# Patient Record
Sex: MALE | Race: WHITE | Employment: OTHER | URBAN - METROPOLITAN AREA
[De-identification: names, ages, dates, MRNs, and addresses within clinical notes are randomized per-mention and may not be internally consistent; named-entity substitution may affect disease eponyms.]

---

## 2017-03-28 ENCOUNTER — TELEPHONE (OUTPATIENT)
Dept: FAMILY MEDICINE | Facility: CLINIC | Age: 77
End: 2017-03-28

## 2017-03-28 NOTE — TELEPHONE ENCOUNTER
Reason for Call:  Other appointment    Detailed comments: The patient wants to become a patient of Dr. Arrieta and he said he   Had his cousin as Dr. Arrieta if he would  as a patient and he said yes but documemtation   Cousin Dante Bowser    Phone Number Patient can be reached at:   [phone]    Best Time: anytime    Can we leave a detailed message on this number? YES    Call taken on 3/28/2017 at 4:05 PM by Jenny Castillo

## 2017-03-29 ENCOUNTER — DOCUMENTATION ONLY (OUTPATIENT)
Dept: LAB | Facility: CLINIC | Age: 77
End: 2017-03-29

## 2017-03-29 DIAGNOSIS — R97.20 ELEVATED PROSTATE SPECIFIC ANTIGEN (PSA): Chronic | ICD-10-CM

## 2017-03-29 DIAGNOSIS — E78.5 HYPERLIPIDEMIA, UNSPECIFIED HYPERLIPIDEMIA TYPE: Primary | Chronic | ICD-10-CM

## 2017-03-30 DIAGNOSIS — E78.5 HYPERLIPIDEMIA, UNSPECIFIED HYPERLIPIDEMIA TYPE: Chronic | ICD-10-CM

## 2017-03-30 DIAGNOSIS — R97.20 ELEVATED PROSTATE SPECIFIC ANTIGEN (PSA): Chronic | ICD-10-CM

## 2017-03-30 LAB
CHOLEST SERPL-MCNC: 181 MG/DL
HDLC SERPL-MCNC: 63 MG/DL
LDLC SERPL CALC-MCNC: 94 MG/DL
NONHDLC SERPL-MCNC: 118 MG/DL
PSA SERPL-ACNC: 8.82 UG/L (ref 0–4)
TRIGL SERPL-MCNC: 119 MG/DL

## 2017-03-30 PROCEDURE — 36415 COLL VENOUS BLD VENIPUNCTURE: CPT | Performed by: INTERNAL MEDICINE

## 2017-03-30 PROCEDURE — G0103 PSA SCREENING: HCPCS | Performed by: INTERNAL MEDICINE

## 2017-03-30 PROCEDURE — 80061 LIPID PANEL: CPT | Performed by: INTERNAL MEDICINE

## 2017-03-30 NOTE — PROGRESS NOTES
Please review, associate diagnosis, and sign pending pre physical lab orders for patient's upcoming 3/30/17 lab appointment.     Thank you,  Lab

## 2017-04-03 ENCOUNTER — OFFICE VISIT (OUTPATIENT)
Dept: FAMILY MEDICINE | Facility: CLINIC | Age: 77
End: 2017-04-03
Payer: COMMERCIAL

## 2017-04-03 VITALS
BODY MASS INDEX: 27.22 KG/M2 | WEIGHT: 179.6 LBS | HEIGHT: 68 IN | OXYGEN SATURATION: 98 % | TEMPERATURE: 97.4 F | DIASTOLIC BLOOD PRESSURE: 85 MMHG | HEART RATE: 96 BPM | SYSTOLIC BLOOD PRESSURE: 129 MMHG

## 2017-04-03 DIAGNOSIS — I10 BENIGN ESSENTIAL HYPERTENSION: Primary | Chronic | ICD-10-CM

## 2017-04-03 DIAGNOSIS — Z23 NEED FOR VACCINATION: ICD-10-CM

## 2017-04-03 DIAGNOSIS — Z00.00 ROUTINE GENERAL MEDICAL EXAMINATION AT A HEALTH CARE FACILITY: ICD-10-CM

## 2017-04-03 DIAGNOSIS — E78.5 HYPERLIPIDEMIA, UNSPECIFIED HYPERLIPIDEMIA TYPE: Chronic | ICD-10-CM

## 2017-04-03 DIAGNOSIS — R97.20 ELEVATED PROSTATE SPECIFIC ANTIGEN (PSA): Chronic | ICD-10-CM

## 2017-04-03 DIAGNOSIS — M10.279 DRUG-INDUCED GOUT OF FOOT, UNSPECIFIED CHRONICITY, UNSPECIFIED LATERALITY: Chronic | ICD-10-CM

## 2017-04-03 LAB
ALBUMIN UR-MCNC: NEGATIVE MG/DL
APPEARANCE UR: CLEAR
BACTERIA #/AREA URNS HPF: ABNORMAL /HPF
BILIRUB UR QL STRIP: NEGATIVE
COLOR UR AUTO: YELLOW
ERYTHROCYTE [DISTWIDTH] IN BLOOD BY AUTOMATED COUNT: 12.9 % (ref 10–15)
GLUCOSE UR STRIP-MCNC: NEGATIVE MG/DL
HCT VFR BLD AUTO: 43.7 % (ref 40–53)
HGB BLD-MCNC: 15 G/DL (ref 13.3–17.7)
HGB UR QL STRIP: ABNORMAL
KETONES UR STRIP-MCNC: NEGATIVE MG/DL
LEUKOCYTE ESTERASE UR QL STRIP: NEGATIVE
MCH RBC QN AUTO: 33.2 PG (ref 26.5–33)
MCHC RBC AUTO-ENTMCNC: 34.3 G/DL (ref 31.5–36.5)
MCV RBC AUTO: 97 FL (ref 78–100)
NITRATE UR QL: NEGATIVE
PH UR STRIP: 5.5 PH (ref 5–7)
PLATELET # BLD AUTO: 190 10E9/L (ref 150–450)
RBC # BLD AUTO: 4.52 10E12/L (ref 4.4–5.9)
RBC #/AREA URNS AUTO: ABNORMAL /HPF (ref 0–2)
SP GR UR STRIP: 1.01 (ref 1–1.03)
URN SPEC COLLECT METH UR: ABNORMAL
UROBILINOGEN UR STRIP-ACNC: 0.2 EU/DL (ref 0.2–1)
WBC # BLD AUTO: 8.1 10E9/L (ref 4–11)
WBC #/AREA URNS AUTO: ABNORMAL /HPF (ref 0–2)

## 2017-04-03 PROCEDURE — 90715 TDAP VACCINE 7 YRS/> IM: CPT | Performed by: INTERNAL MEDICINE

## 2017-04-03 PROCEDURE — 80053 COMPREHEN METABOLIC PANEL: CPT | Performed by: INTERNAL MEDICINE

## 2017-04-03 PROCEDURE — 85027 COMPLETE CBC AUTOMATED: CPT | Performed by: INTERNAL MEDICINE

## 2017-04-03 PROCEDURE — G0009 ADMIN PNEUMOCOCCAL VACCINE: HCPCS | Performed by: INTERNAL MEDICINE

## 2017-04-03 PROCEDURE — 81001 URINALYSIS AUTO W/SCOPE: CPT | Performed by: INTERNAL MEDICINE

## 2017-04-03 PROCEDURE — G0438 PPPS, INITIAL VISIT: HCPCS | Performed by: INTERNAL MEDICINE

## 2017-04-03 PROCEDURE — 90732 PPSV23 VACC 2 YRS+ SUBQ/IM: CPT | Performed by: INTERNAL MEDICINE

## 2017-04-03 PROCEDURE — 90471 IMMUNIZATION ADMIN: CPT | Performed by: INTERNAL MEDICINE

## 2017-04-03 PROCEDURE — 84550 ASSAY OF BLOOD/URIC ACID: CPT | Performed by: INTERNAL MEDICINE

## 2017-04-03 PROCEDURE — 36415 COLL VENOUS BLD VENIPUNCTURE: CPT | Performed by: INTERNAL MEDICINE

## 2017-04-03 NOTE — NURSING NOTE
"Chief Complaint   Patient presents with     Wellness Visit       Initial /85 (BP Location: Left arm, Patient Position: Chair, Cuff Size: Adult Regular)  Pulse 96  Temp 97.4  F (36.3  C) (Tympanic)  Ht 5' 8\" (1.727 m)  Wt 179 lb 9.6 oz (81.5 kg)  SpO2 98%  BMI 27.31 kg/m2 Estimated body mass index is 27.31 kg/(m^2) as calculated from the following:    Height as of this encounter: 5' 8\" (1.727 m).    Weight as of this encounter: 179 lb 9.6 oz (81.5 kg).  Medication Reconciliation: complete     MALCOLM Jay MA April 3, 2017 4:08 PM  '  "

## 2017-04-03 NOTE — MR AVS SNAPSHOT
After Visit Summary   4/3/2017    Spenser Biggs    MRN: [id number]           Patient Information     Date Of Birth          1940        Visit Information        Provider Department      4/3/2017 4:00 PM Jose Arrieta MD [facility]        Today's Diagnoses     Benign essential hypertension; goal < 140/90    -  1    Hyperlipidemia, unspecified hyperlipidemia type        Drug-induced gout of foot, unspecified chronicity, unspecified laterality        Elevated prostate specific antigen (PSA)        Routine general medical examination at a health care facility        Need for vaccination          Care Instructions      Preventive Health Recommendations:       Male Ages 65 and over    Yearly exam:             See your health care provider every year in order to  o   Review health changes.   o   Discuss preventive care.    o   Review your medicines if your doctor has prescribed any.    Talk with your health care provider about whether you should have a test to screen for prostate cancer (PSA).    Every 3 years, have a diabetes test (fasting glucose). If you are at risk for diabetes, you should have this test more often.    Every 5 years, have a cholesterol test. Have this test more often if you are at risk for high cholesterol or heart disease.     Every 10 years, have a colonoscopy. Or, have a yearly FIT test (stool test). These exams will check for colon cancer.    Talk to with your health care provider about screening for Abdominal Aortic Aneurysm if you have a family history of AAA or have a history of smoking.  Shots:     Get a flu shot each year.     Get a tetanus shot every 10 years.     Talk to your doctor about your pneumonia vaccines. There are now two you should receive - Pneumovax (PPSV 23) and Prevnar (PCV 13).    Talk to your doctor about a shingles vaccine.     Talk to your doctor about the hepatitis B vaccine.  Nutrition:     Eat at least 5 servings of fruits and vegetables  "each day.     Eat whole-grain bread, whole-wheat pasta and brown rice instead of white grains and rice.     Talk to your doctor about Calcium and Vitamin D.   Lifestyle    Exercise for at least 150 minutes a week (30 minutes a day, 5 days a week). This will help you control your weight and prevent disease.     Limit alcohol to one drink per day.     No smoking.     Wear sunscreen to prevent skin cancer.     See your dentist every six months for an exam and cleaning.     See your eye doctor every 1 to 2 years to screen for conditions such as glaucoma, macular degeneration and cataracts.        Follow-ups after your visit        Who to contact     If you have questions or need follow up information about today's clinic visit or your schedule please contact [facility] directly at [phone].  Normal or non-critical lab and imaging results will be communicated to you by MyChart, letter or phone within 4 business days after the clinic has received the results. If you do not hear from us within 7 days, please contact the clinic through MyChart or phone. If you have a critical or abnormal lab result, we will notify you by phone as soon as possible.  Submit refill requests through "[employer] or call your pharmacy and they will forward the refill request to us. Please allow 3 business days for your refill to be completed.          Additional Information About Your Visit        "[employer] Information     "[employer] lets you send messages to your doctor, view your test results, renew your prescriptions, schedule appointments and more. To sign up, go to [email or web address] . Click on \"Log in\" on the left side of the screen, which will take you to the Welcome page. Then click on \"Sign up Now\" on the right side of the page.     You will be asked to enter the access code listed below, as well as some personal information. Please follow the directions to create your username and password.     Your access code is: " [email or web address]  Expires: 2017  3:47 AM     Your access code will  in 90 days. If you need help or a new code, please call your [place] clinic or [phone].        Care EveryWhere ID     This is your Care EveryWhere ID. This could be used by other organizations to access your [place] medical records  [id number]        Your Vitals Were     Pulse Temperature Height Pulse Oximetry BMI (Body Mass Index)       96 97.4  F (36.3  C) (Tympanic) 5' 8\" (1.727 m) 98% 27.31 kg/m2        Blood Pressure from Last 3 Encounters:   17 129/85   10/17/16 138/80    Weight from Last 3 Encounters:   17 179 lb 9.6 oz (81.5 kg)   10/17/16 174 lb (78.9 kg)              We Performed the Following     1st  Administration  [70829]     CBC with platelets     Comprehensive metabolic panel     Pneumococcal vaccine 23 valent PPSV23  (Pneumovax) [64720]     PNEUMOVAX - MEDICARE     TDAP VACCINE (ADACEL)     UA reflex to Microscopic and Culture     Uric acid     Urine Microscopic        Primary Care Provider Office Phone # Fax #    Nicole Long,  [phone]       [facility] [address] [facility] [address]  [facility] [address]        Thank you!     Thank you for choosing [facility]  for your care. Our goal is always to provide you with excellent care. Hearing back from our patients is one way we can continue to improve our services. Please take a few minutes to complete the written survey that you may receive in the mail after your visit with us. Thank you!             Your Updated Medication List - Protect others around you: Learn how to safely use, store and throw away your medicines at www.disposemymeds.org.          This list is accurate as of: 4/3/17 11:59 PM.  Always use your most recent med list.                   Brand Name Dispense Instructions for use    ASPIRIN PO      Take 81 mg by mouth daily       COENZYME Q-10 PO      Take 200 mg by mouth daily       EYE VITAMINS PO      Take 1 " tablet by mouth daily       lisinopril 40 MG tablet    PRINIVIL/ZESTRIL    90 tablet    Take 1 tablet (40 mg) by mouth daily       probenecid 500 MG tablet    BENEMID    90 tablet    Take 1 tablet (500 mg) by mouth daily       simvastatin 20 MG tablet    ZOCOR    90 tablet    Take 1 tablet (20 mg) by mouth At Bedtime       VITAMIN B 12 PO      Take 1,000 mcg by mouth daily

## 2017-04-03 NOTE — PROGRESS NOTES
SUBJECTIVE:                                                            Spenser Biggs is a 77 year old male who presents for Preventive Visit.    Patient with history of elevated PSA and hypertension presents to the clinic for a preventive health visit. His last PSA level was 9.7 in October, he has had biopsies of his prostate and they've all been negative. His PSA from his most recent check in the spring was an 8.87. He exercises by jogging 2 miles three to four times a week. He denies headaches, bad cold/flu, changes in vision, neck or back pain, shortness of breath, chest pain/discomfort, palpitations or racing heart beats, changes in bowel habits, diarrhea/constipation, hematochezia, nocturia, and muscle, bones, joint pain. He has hay fever in the spring.    Medications:  Lisinopril  Simvastatin  Probenecid---discontinued, no gout attacks in couple years  Multi Vitamin  Vitamin B12  Co Q-10---discontinued    Are you in the first 12 months of your Medicare Part B coverage?  No    Healthy Habits:    Do you get at least three servings of calcium containing foods daily (dairy, green leafy vegetables, etc.)? yes    Amount of exercise or daily activities, outside of work: 4 day(s) per week    Problems taking medications regularly Yes     Medication side effects: No    Have you had an eye exam in the past two years? yes    Do you see a dentist twice per year? yes    Do you have sleep apnea, excessive snoring or daytime drowsiness?no    COGNITIVE SCREEN  1) Repeat 3 items (Banana, Sunrise, Chair)    2) Clock draw: NORMAL  3) 3 item recall: Recalls 3 objects  Results: NORMAL clock, 1-2 items recalled: COGNITIVE IMPAIRMENT LESS LIKELY    Mini-CogTM Copyright S [employer]. Licensed by the author for use in [facility]; reprinted with permission (gloria@.[facility]). All rights reserved.      Social History   Substance Use Topics     Smoking status: Light Tobacco Smoker     Smokeless tobacco: Never Used      Comment: 2  cigars per year occasionally     Alcohol use 0.0 oz/week     0 Standard drinks or equivalent per week      Comment: a beer at night       The patient does not drink >3 drinks per day nor >7 drinks per week.    Today's PHQ-2 Score: No flowsheet data found.    Do you feel safe in your environment - Yes    Do you have a Health Care Directive?: No: Advance care planning reviewed with patient; information given to patient to review.    Current providers sharing in care for this patient include:   Patient Care Team:  Nicole Long DO as PCP - General (Internal Medicine)      Hearing impairment: No    Ability to successfully perform activities of daily living: Yes, no assistance needed     Fall risk:       Home safety:  none identified      The following health maintenance items are reviewed in Epic and correct as of today:  Health Maintenance   Topic Date Due     TETANUS IMMUNIZATION (SYSTEM ASSIGNED)  03/11/1958     ADVANCE DIRECTIVE PLANNING Q5 YRS (NO INBASKET)  03/11/1958     FALL RISK ASSESSMENT  03/11/2005     PNEUMOCOCCAL (1 of 2 - PCV13) 03/11/2005     INFLUENZA VACCINE (SYSTEM ASSIGNED)  09/01/2017     LIPID SCREEN Q5 YR MALE (SYSTEM ASSIGNED)  03/30/2022     ROS:  Constitutional, HEENT, cardiovascular, pulmonary, GI, , musculoskeletal, neuro, skin, endocrine and psych systems are negative, except as otherwise noted.    Current Outpatient Prescriptions   Medication Sig Dispense Refill     probenecid (BENEMID) 500 MG tablet Take 1 tablet (500 mg) by mouth daily 90 tablet 1     COENZYME Q-10 PO Take 200 mg by mouth daily       Multiple Vitamins-Minerals (EYE VITAMINS PO) Take 1 tablet by mouth daily       Cyanocobalamin (VITAMIN B 12 PO) Take 1,000 mcg by mouth daily        ASPIRIN PO Take 81 mg by mouth daily       simvastatin (ZOCOR) 20 MG tablet Take 1 tablet (20 mg) by mouth At Bedtime 90 tablet 1     lisinopril (PRINIVIL,ZESTRIL) 40 MG tablet Take 1 tablet (40 mg) by mouth daily 90 tablet 1     No Known  "Allergies     This document serves as a record of the services and decisions personally performed and made by Jose Arrieta MD. It was created on his/her behalf by Michael Smith, a trained medical scribe. The creation of this document is based the provider's statements to the medical scribe.  Garcia Smith 5:27 PM, April 3, 2017    OBJECTIVE:                                                            There were no vitals taken for this visit. Estimated body mass index is 26.07 kg/(m^2) as calculated from the following:    Height as of 10/17/16: 5' 8.5\" (1.74 m).    Weight as of 10/17/16: 174 lb (78.9 kg).  EXAM:   GENERAL: healthy, alert and no distress  EYES: Eyes grossly normal to inspection, PERRL and conjunctivae and sclerae normal  HENT: ear canals and TM's normal, nose and mouth without ulcers or lesions  NECK: no adenopathy, no asymmetry, masses, or scars and thyroid normal to palpation  RESP: lungs clear to auscultation - no rales, rhonchi or wheezes  CV: regular rate and rhythm, normal S1 S2, no S3 or S4, no murmur, click or rub, no peripheral edema and peripheral pulses strong  ABDOMEN: soft, nontender, no hepatosplenomegaly, no masses and bowel sounds normal  MS: no gross musculoskeletal defects noted, no edema  SKIN: no suspicious lesions or rashes  NEURO: Normal strength and tone, mentation intact and speech normal  PSYCH: mentation appears normal, affect normal/bright    ASSESSMENT / PLAN:                                                            1. Benign essential hypertension; goal < 140/90    2. Hyperlipidemia, unspecified hyperlipidemia type    3. Drug-induced gout of foot, unspecified chronicity, unspecified laterality  - Uric acid    4. Elevated prostate specific antigen (PSA)  PSA remains stable and suggestive of enlargement of the prostate gland and not prostate cancer. He's requested to return to clinic in six months or ongoing surveillance    5. Routine general medical " "examination at a health care facility  - UA reflex to Microscopic and Culture  - CBC with platelets  - Comprehensive metabolic panel  - TDAP VACCINE (ADACEL)  - PNEUMOVAX - MEDICARE    6. Need for vaccination  - Pneumococcal vaccine 23 valent PPSV23  (Pneumovax) [57297]  - 1st  Administration  [03577]    End of Life Planning:  Patient currently has an advanced directive: No.  I have verified the patient's ablity to prepare an advanced directive/make health care decisions.  Literature was provided to assist patient in preparing an advanced directive.    COUNSELING:  Reviewed preventive health counseling, as reflected in patient instructions       Regular exercise       Healthy diet/nutrition    Estimated body mass index is 26.07 kg/(m^2) as calculated from the following:    Height as of 10/17/16: 5' 8.5\" (1.74 m).    Weight as of 10/17/16: 174 lb (78.9 kg).     reports that he has been smoking.  He has never used smokeless tobacco.    Appropriate preventive services were discussed with this patient, including applicable screening as appropriate for cardiovascular disease, diabetes, osteopenia/osteoporosis, and glaucoma.  As appropriate for age/gender, discussed screening for colorectal cancer, prostate cancer, breast cancer, and cervical cancer. Checklist reviewing preventive services available has been given to the patient.    Reviewed patients plan of care and provided an AVS. The Basic Care Plan (routine screening as documented in Health Maintenance) for Spenser meets the Care Plan requirement. This Care Plan has been established and reviewed with the Patient.    Counseling Resources:  ATP IV Guidelines  Pooled Cohorts Equation Calculator  Breast Cancer Risk Calculator  FRAX Risk Assessment  ICSI Preventive Guidelines  Dietary Guidelines for Americans, 2010  USDA's MyPlate  ASA Prophylaxis  Lung CA Screening    The information in this document, created by the medical scribe for me, accurately reflects the services I " personally performed and the decisions made by me. I have reviewed and approved this document for accuracy prior to leaving the patient care area.  Jose Arrieta MD  5:00 PM, 04/03/17    Jose Arrieta MD  [facility]

## 2017-04-04 LAB
ALBUMIN SERPL-MCNC: 4.1 G/DL (ref 3.4–5)
ALP SERPL-CCNC: 60 U/L (ref 40–150)
ALT SERPL W P-5'-P-CCNC: 27 U/L (ref 0–70)
ANION GAP SERPL CALCULATED.3IONS-SCNC: 9 MMOL/L (ref 3–14)
AST SERPL W P-5'-P-CCNC: 20 U/L (ref 0–45)
BILIRUB SERPL-MCNC: 0.6 MG/DL (ref 0.2–1.3)
BUN SERPL-MCNC: 19 MG/DL (ref 7–30)
CALCIUM SERPL-MCNC: 9.1 MG/DL (ref 8.5–10.1)
CHLORIDE SERPL-SCNC: 104 MMOL/L (ref 94–109)
CO2 SERPL-SCNC: 24 MMOL/L (ref 20–32)
CREAT SERPL-MCNC: 1.05 MG/DL (ref 0.66–1.25)
GFR SERPL CREATININE-BSD FRML MDRD: 68 ML/MIN/1.7M2
GLUCOSE SERPL-MCNC: 98 MG/DL (ref 70–99)
POTASSIUM SERPL-SCNC: 4 MMOL/L (ref 3.4–5.3)
PROT SERPL-MCNC: 7.4 G/DL (ref 6.8–8.8)
SODIUM SERPL-SCNC: 137 MMOL/L (ref 133–144)
URATE SERPL-MCNC: 8 MG/DL (ref 3.5–7.2)

## 2017-05-01 DIAGNOSIS — E78.5 HYPERLIPIDEMIA, UNSPECIFIED HYPERLIPIDEMIA TYPE: ICD-10-CM

## 2017-05-01 DIAGNOSIS — I10 BENIGN ESSENTIAL HYPERTENSION: ICD-10-CM

## 2017-05-01 RX ORDER — LISINOPRIL 40 MG/1
40 TABLET ORAL DAILY
Qty: 90 TABLET | Refills: 1 | Status: CANCELLED | OUTPATIENT
Start: 2017-05-01

## 2017-05-01 NOTE — TELEPHONE ENCOUNTER
lisinopril (PRINIVIL,ZESTRIL) 40 MG tablet      Last Written Prescription Date: 10/17/16  Last Fill Quantity: 90, # refills: 1  Last Office Visit with G, P or [facility] prescribing provider: 4/3/17       Potassium   Date Value Ref Range Status   04/03/2017 4.0 3.4 - 5.3 mmol/L Final     Creatinine   Date Value Ref Range Status   04/03/2017 1.05 0.66 - 1.25 mg/dL Final     BP Readings from Last 3 Encounters:   04/03/17 129/85   10/17/16 138/80

## 2017-05-02 DIAGNOSIS — E78.5 HYPERLIPIDEMIA, UNSPECIFIED HYPERLIPIDEMIA TYPE: ICD-10-CM

## 2017-05-02 RX ORDER — LISINOPRIL 40 MG/1
40 TABLET ORAL DAILY
Qty: 90 TABLET | Refills: 1 | Status: SHIPPED | OUTPATIENT
Start: 2017-05-02 | End: 2017-10-31

## 2017-05-02 RX ORDER — SIMVASTATIN 20 MG
20 TABLET ORAL AT BEDTIME
Qty: 90 TABLET | Refills: 2 | Status: SHIPPED | OUTPATIENT
Start: 2017-05-02 | End: 2017-10-31

## 2017-05-02 RX ORDER — LISINOPRIL 40 MG/1
40 TABLET ORAL DAILY
Qty: 90 TABLET | Refills: 1 | Status: CANCELLED | OUTPATIENT
Start: 2017-05-02

## 2017-05-02 NOTE — TELEPHONE ENCOUNTER
Prescription approved per [facility] Refill Protocol.  Nadya Powers RN

## 2017-05-02 NOTE — TELEPHONE ENCOUNTER
Pending Prescriptions:                       Disp   Refills    simvastatin (ZOCOR) 20 MG tablet          90 tab*1            Sig: Take 1 tablet (20 mg) by mouth At Bedtime         Last Written Prescription Date: 10/17/16  Last Fill Quantity: 90, # refills: 1  Last Office Visit with [facility], [facility] or [facility] prescribing provider: 4/3/17       Lab Results   Component Value Date    CHOL 181 03/30/2017     Lab Results   Component Value Date    HDL 63 03/30/2017     Lab Results   Component Value Date    LDL 94 03/30/2017     Lab Results   Component Value Date    TRIG 119 03/30/2017     No results found for: DENI

## 2017-06-16 ENCOUNTER — OFFICE VISIT (OUTPATIENT)
Dept: URGENT CARE | Facility: URGENT CARE | Age: 77
End: 2017-06-16
Payer: COMMERCIAL

## 2017-06-16 VITALS
DIASTOLIC BLOOD PRESSURE: 86 MMHG | BODY MASS INDEX: 26.53 KG/M2 | SYSTOLIC BLOOD PRESSURE: 132 MMHG | OXYGEN SATURATION: 98 % | TEMPERATURE: 98.9 F | HEART RATE: 98 BPM | WEIGHT: 174.5 LBS

## 2017-06-16 DIAGNOSIS — H10.023 PINK EYE DISEASE OF BOTH EYES: Primary | ICD-10-CM

## 2017-06-16 DIAGNOSIS — J20.9 ACUTE BRONCHITIS WITH SYMPTOMS > 10 DAYS: ICD-10-CM

## 2017-06-16 PROCEDURE — 99213 OFFICE O/P EST LOW 20 MIN: CPT | Performed by: FAMILY MEDICINE

## 2017-06-16 RX ORDER — AZITHROMYCIN 250 MG/1
TABLET, FILM COATED ORAL
Qty: 6 TABLET | Refills: 0 | Status: SHIPPED | OUTPATIENT
Start: 2017-06-16 | End: 2017-06-21

## 2017-06-16 RX ORDER — TOBRAMYCIN 3 MG/ML
2 SOLUTION/ DROPS OPHTHALMIC 4 TIMES DAILY
Qty: 3 ML | Refills: 0 | Status: SHIPPED | OUTPATIENT
Start: 2017-06-16 | End: 2017-06-23

## 2017-06-16 NOTE — PROGRESS NOTES
"SUBJECTIVE: Spenser Biggs is a 77 year old male presenting with a chief complaint of cough  and pink mattery eyes.  Onset of symptoms was 10 day(s) ago.  Course of illness is worsening.    Severity moderate  Current and Associated symptoms: \"cold symptoms\"  Treatment measures tried include OTC meds.  Predisposing factors include None.    Past Medical History:   Diagnosis Date     Hyperlipidemia      Hypertension      Allergies   Allergen Reactions     Seasonal Allergies      Hay Fever     Social History   Substance Use Topics     Smoking status: Former Smoker     Smokeless tobacco: Never Used      Comment: 2 cigars per year occasionally     Alcohol use 0.0 oz/week     0 Standard drinks or equivalent per week      Comment: a beer at night       ROS:  SKIN: no rash  GI: no vomiting    OBJECTIVE:  /86 (BP Location: Left arm, Patient Position: Chair, Cuff Size: Adult Regular)  Pulse 98  Temp 98.9  F (37.2  C) (Oral)  Wt 174 lb 8 oz (79.2 kg)  SpO2 98%  BMI 26.53 kg/m2   GENERAL APPEARANCE: healthy, alert and no distress  EYES: conjunctiva/corneas- conjunctival injection OU and yellow colored discharge present bilateral  HENT: ear canals and TM's normal.  Nose and mouth without ulcers, erythema or lesions  NECK: supple, nontender, no lymphadenopathy  RESP: lungs clear to auscultation - no rales, rhonchi or wheezes  SKIN: no suspicious lesions or rashes      ICD-10-CM    1. Pink eye disease of both eyes H10.023 tobramycin (TOBREX) 0.3 % ophthalmic solution   2. Acute bronchitis with symptoms > 10 days J20.9 azithromycin (ZITHROMAX) 250 MG tablet       Fluids/Rest, f/u if worse/not any better    "

## 2017-06-16 NOTE — MR AVS SNAPSHOT
"              After Visit Summary   2017    Spenser Biggs    MRN: [id number]           Patient Information     Date Of Birth          1940        Visit Information        Provider Department      2017 11:05 AM Julien Acosta, DO [facility]        Today's Diagnoses     Pink eye disease of both eyes    -  1    Acute bronchitis with symptoms > 10 days           Follow-ups after your visit        Who to contact     If you have questions or need follow up information about today's clinic visit or your schedule please contact [facility] directly at [phone].  Normal or non-critical lab and imaging results will be communicated to you by MyChart, letter or phone within 4 business days after the clinic has received the results. If you do not hear from us within 7 days, please contact the clinic through [email or web address] or phone. If you have a critical or abnormal lab result, we will notify you by phone as soon as possible.  Submit refill requests through [employer] or call your pharmacy and they will forward the refill request to us. Please allow 3 business days for your refill to be completed.          Additional Information About Your Visit        MyChart Information     [employer] lets you send messages to your doctor, view your test results, renew your prescriptions, schedule appointments and more. To sign up, go to [email or web address] . Click on \"Log in\" on the left side of the screen, which will take you to the Welcome page. Then click on \"Sign up Now\" on the right side of the page.     You will be asked to enter the access code listed below, as well as some personal information. Please follow the directions to create your username and password.     Your access code is: [email or web address]  Expires: 2017  3:47 AM     Your access code will  in 90 days. If you need help or a new code, please call your [place] clinic or [phone].        Care " EveryWhere ID     This is your Care EveryWhere ID. This could be used by other organizations to access your [place] medical records  [id number]        Your Vitals Were     Pulse Temperature Pulse Oximetry BMI (Body Mass Index)          98 98.9  F (37.2  C) (Oral) 98% 26.53 kg/m2         Blood Pressure from Last 3 Encounters:   06/16/17 132/86   04/03/17 129/85   10/17/16 138/80    Weight from Last 3 Encounters:   06/16/17 174 lb 8 oz (79.2 kg)   04/03/17 179 lb 9.6 oz (81.5 kg)   10/17/16 174 lb (78.9 kg)              Today, you had the following     No orders found for display         Today's Medication Changes          These changes are accurate as of: 6/16/17 11:32 AM.  If you have any questions, ask your nurse or doctor.               Start taking these medicines.        Dose/Directions    azithromycin 250 MG tablet   Commonly known as:  ZITHROMAX   Used for:  Acute bronchitis with symptoms > 10 days   Started by:  Julien Acosta,         Two tablets first day, then one tablet daily for four days.   Quantity:  6 tablet   Refills:  0       tobramycin 0.3 % ophthalmic solution   Commonly known as:  TOBREX   Used for:  Pink eye disease of both eyes   Started by:  Julien Acosta DO        Dose:  2 drop   Place 2 drops into both eyes 4 times daily for 7 days   Quantity:  3 mL   Refills:  0            Where to get your medicines      These medications were sent to [facility] [address] [address]     Phone:  [phone]     azithromycin 250 MG tablet    tobramycin 0.3 % ophthalmic solution                Primary Care Provider Office Phone # Fax #    Nicole Ansari DO [place] [phone]       [facility] [address] [facility] [address]  [facility] [address]        Thank you!     Thank you for choosing [facility]  for your care. Our goal is always to provide you with excellent care. Hearing back from  our patients is one way we can continue to improve our services. Please take a few minutes to complete the written survey that you may receive in the mail after your visit with us. Thank you!             Your Updated Medication List - Protect others around you: Learn how to safely use, store and throw away your medicines at www.disposemymeds.org.          This list is accurate as of: 6/16/17 11:32 AM.  Always use your most recent med list.                   Brand Name Dispense Instructions for use    ASPIRIN PO      Take 81 mg by mouth daily       azithromycin 250 MG tablet    ZITHROMAX    6 tablet    Two tablets first day, then one tablet daily for four days.       BENADRYL PO      Take by mouth nightly as needed       COENZYME Q-10 PO      Take 200 mg by mouth daily       EYE VITAMINS PO      Take 1 tablet by mouth daily       lisinopril 40 MG tablet    PRINIVIL/ZESTRIL    90 tablet    Take 1 tablet (40 mg) by mouth daily       probenecid 500 MG tablet    BENEMID    90 tablet    Take 1 tablet (500 mg) by mouth daily       simvastatin 20 MG tablet    ZOCOR    90 tablet    Take 1 tablet (20 mg) by mouth At Bedtime       tobramycin 0.3 % ophthalmic solution    TOBREX    3 mL    Place 2 drops into both eyes 4 times daily for 7 days       VITAMIN B 12 PO      Take 1,000 mcg by mouth daily

## 2017-06-16 NOTE — NURSING NOTE
"Chief Complaint   Patient presents with     Cough     x 1 week      Eye Problem     bilateral eye discharge, itching and redness x 3-4 days        Initial /86 (BP Location: Left arm, Patient Position: Chair, Cuff Size: Adult Regular)  Pulse 98  Temp 98.9  F (37.2  C) (Oral)  Wt 174 lb 8 oz (79.2 kg)  SpO2 98%  BMI 26.53 kg/m2 Estimated body mass index is 26.53 kg/(m^2) as calculated from the following:    Height as of 4/3/17: 5' 8\" (1.727 m).    Weight as of this encounter: 174 lb 8 oz (79.2 kg).  Medication Reconciliation: complete    "

## 2017-10-31 ENCOUNTER — OFFICE VISIT (OUTPATIENT)
Dept: FAMILY MEDICINE | Facility: CLINIC | Age: 77
End: 2017-10-31
Payer: COMMERCIAL

## 2017-10-31 VITALS
HEIGHT: 68 IN | OXYGEN SATURATION: 99 % | SYSTOLIC BLOOD PRESSURE: 150 MMHG | TEMPERATURE: 97.5 F | BODY MASS INDEX: 26.9 KG/M2 | DIASTOLIC BLOOD PRESSURE: 80 MMHG | HEART RATE: 75 BPM | WEIGHT: 177.5 LBS

## 2017-10-31 DIAGNOSIS — Z23 NEED FOR VACCINATION WITH 13-POLYVALENT PNEUMOCOCCAL CONJUGATE VACCINE: ICD-10-CM

## 2017-10-31 DIAGNOSIS — E78.5 HYPERLIPIDEMIA, UNSPECIFIED HYPERLIPIDEMIA TYPE: ICD-10-CM

## 2017-10-31 DIAGNOSIS — I10 BENIGN ESSENTIAL HYPERTENSION: ICD-10-CM

## 2017-10-31 DIAGNOSIS — Z23 NEED FOR PROPHYLACTIC VACCINATION AND INOCULATION AGAINST INFLUENZA: Primary | ICD-10-CM

## 2017-10-31 DIAGNOSIS — M10.279 DRUG-INDUCED GOUT OF FOOT, UNSPECIFIED CHRONICITY, UNSPECIFIED LATERALITY: ICD-10-CM

## 2017-10-31 PROCEDURE — G0009 ADMIN PNEUMOCOCCAL VACCINE: HCPCS | Performed by: INTERNAL MEDICINE

## 2017-10-31 PROCEDURE — 90662 IIV NO PRSV INCREASED AG IM: CPT | Performed by: INTERNAL MEDICINE

## 2017-10-31 PROCEDURE — 90670 PCV13 VACCINE IM: CPT | Performed by: INTERNAL MEDICINE

## 2017-10-31 PROCEDURE — 99214 OFFICE O/P EST MOD 30 MIN: CPT | Mod: 25 | Performed by: INTERNAL MEDICINE

## 2017-10-31 PROCEDURE — G0008 ADMIN INFLUENZA VIRUS VAC: HCPCS | Performed by: INTERNAL MEDICINE

## 2017-10-31 RX ORDER — LISINOPRIL 40 MG/1
40 TABLET ORAL DAILY
Qty: 90 TABLET | Refills: 3 | Status: SHIPPED | OUTPATIENT
Start: 2017-10-31 | End: 2019-02-06

## 2017-10-31 RX ORDER — AMLODIPINE BESYLATE 5 MG/1
5 TABLET ORAL DAILY
Qty: 30 TABLET | Refills: 1 | Status: SHIPPED | OUTPATIENT
Start: 2017-10-31 | End: 2017-11-21

## 2017-10-31 RX ORDER — PROBENECID 500 MG/1
500 TABLET, FILM COATED ORAL DAILY
Qty: 90 TABLET | Refills: 3 | Status: SHIPPED | OUTPATIENT
Start: 2017-10-31 | End: 2018-05-31

## 2017-10-31 RX ORDER — SIMVASTATIN 20 MG
20 TABLET ORAL AT BEDTIME
Qty: 90 TABLET | Refills: 3 | Status: SHIPPED | OUTPATIENT
Start: 2017-10-31 | End: 2018-12-29

## 2017-10-31 NOTE — MR AVS SNAPSHOT
After Visit Summary   10/31/2017    Spenser Biggs    MRN: [id number]           Patient Information     Date Of Birth          1940        Visit Information        Provider Department      10/31/2017 8:30 AM Jose Arrieta MD [facility]        Today's Diagnoses     Need for prophylactic vaccination and inoculation against influenza    -  1    Drug-induced gout of foot, unspecified chronicity, unspecified laterality        Benign essential hypertension        Hyperlipidemia, unspecified hyperlipidemia type        Need for vaccination with 13-polyvalent pneumococcal conjugate vaccine           Follow-ups after your visit        Who to contact     If you have questions or need follow up information about today's clinic visit or your schedule please contact [facility] directly at [phone].  Normal or non-critical lab and imaging results will be communicated to you by MyChart, letter or phone within 4 business days after the clinic has received the results. If you do not hear from us within 7 days, please contact the clinic through Dualoghart or phone. If you have a critical or abnormal lab result, we will notify you by phone as soon as possible.  Submit refill requests through [employer]ations or call your pharmacy and they will forward the refill request to us. Please allow 3 business days for your refill to be completed.          Additional Information About Your Visit        MyChart Information     [employer] gives you secure access to your electronic health record. If you see a primary care provider, you can also send messages to your care team and make appointments. If you have questions, please call your primary care clinic.  If you do not have a primary care provider, please call [phone] and they will assist you.        Care EveryWhere ID     This is your Care EveryWhere ID. This could be used by other organizations to access your [facility] medical records  [id number]       "  Your Vitals Were     Pulse Temperature Height Pulse Oximetry BMI (Body Mass Index)       75 97.5  F (36.4  C) (Oral) 5' 8\" (1.727 m) 99% 26.99 kg/m2        Blood Pressure from Last 3 Encounters:   10/31/17 150/80   06/16/17 132/86   04/03/17 129/85    Weight from Last 3 Encounters:   10/31/17 177 lb 8 oz (80.5 kg)   06/16/17 174 lb 8 oz (79.2 kg)   04/03/17 179 lb 9.6 oz (81.5 kg)              We Performed the Following     ADMIN: Vaccine, Initial (62355)     FLU VACCINE, INCREASED ANTIGEN, PRESV FREE     INJECTION INTRAMUSCULAR OR SUB-Q     MYCHART ACCESS CODE - Add PCP and Click on cosign on right     Pneumococcal vaccine 13 valent PCV13 IM (Prevnar) [68879]          Today's Medication Changes          These changes are accurate as of: 10/31/17 11:59 PM.  If you have any questions, ask your nurse or doctor.               Start taking these medicines.        Dose/Directions    amLODIPine 5 MG tablet   Commonly known as:  NORVASC   Used for:  Benign essential hypertension   Started by:  Jose Arrieta MD        Dose:  5 mg   Take 1 tablet (5 mg) by mouth daily   Quantity:  30 tablet   Refills:  1            Where to get your medicines      These medications were [facility] to [id number] [place] [facility] [address]  [address] [facility] [address]     Phone:  [phone]     amLODIPine 5 MG tablet    lisinopril 40 MG tablet    probenecid 500 MG tablet    simvastatin 20 MG tablet                Primary Care Provider Office Phone # Fax #    Jose Arrieta [phone] [address]  [facility] [address]        Equal Access to Services     ZANE JACQUES AH: Gildardo Grimaldo, meena angulo, qaerikta kaalmada adejayda, dave malin. [facility] [phone].    ATENCIÓN: Si habla español, tiene a delacruz disposición servicios gratuitos de asistencia lingüística. Llame al [phone].    We comply with applicable federal civil " rights laws and [place] laws. We do not discriminate on the basis of race, color, national origin, age, disability, sex, sexual orientation, or gender identity.            Thank you!     Thank you for choosing [facility]  for your care. Our goal is always to provide you with excellent care. Hearing back from our patients is one way we can continue to improve our services. Please take a few minutes to complete the written survey that you may receive in the mail after your visit with us. Thank you!             Your Updated Medication List - Protect others around you: Learn how to safely use, store and throw away your medicines at www.disposemymeds.org.          This list is accurate as of: 10/31/17 11:59 PM.  Always use your most recent med list.                   Brand Name Dispense Instructions for use Diagnosis    amLODIPine 5 MG tablet    NORVASC    30 tablet    Take 1 tablet (5 mg) by mouth daily    Benign essential hypertension       ASPIRIN PO      Take 81 mg by mouth daily        BENADRYL PO      Take by mouth nightly as needed        COENZYME Q-10 PO      Take 200 mg by mouth daily        EYE VITAMINS PO      Take 1 tablet by mouth daily        lisinopril 40 MG tablet    PRINIVIL/ZESTRIL    90 tablet    Take 1 tablet (40 mg) by mouth daily    Benign essential hypertension       probenecid 500 MG tablet    BENEMID    90 tablet    Take 1 tablet (500 mg) by mouth daily    Drug-induced gout of foot, unspecified chronicity, unspecified laterality       simvastatin 20 MG tablet    ZOCOR    90 tablet    Take 1 tablet (20 mg) by mouth At Bedtime    Hyperlipidemia, unspecified hyperlipidemia type       VITAMIN B 12 PO      Take 1,000 mcg by mouth daily

## 2017-10-31 NOTE — NURSING NOTE
"Chief Complaint   Patient presents with     Follow Up For     Hypertension     Lipids       Initial /86 (BP Location: Left arm, Patient Position: Chair, Cuff Size: Adult Regular)  Pulse 75  Temp 97.5  F (36.4  C) (Oral)  Ht 5' 8\" (1.727 m)  Wt 177 lb 8 oz (80.5 kg)  SpO2 99%  BMI 26.99 kg/m2 Estimated body mass index is 26.99 kg/(m^2) as calculated from the following:    Height as of this encounter: 5' 8\" (1.727 m).    Weight as of this encounter: 177 lb 8 oz (80.5 kg).  Medication Reconciliation: complete   Christine Ponce MA    "

## 2017-10-31 NOTE — PROGRESS NOTES
SUBJECTIVE:   Spenser Biggs is a 77 year old male who presents to clinic today for the following health issues:    Hyperlipidemia Follow-Up      Rate your low fat/cholesterol diet?: fair    Taking statin?  Yes, no muscle aches from statin    Other lipid medications/supplements?:  none    Hypertension Follow-up      Outpatient blood pressures are being checked at home.  Results are  Elevated x 1 week    Low Salt Diet: not monitoring salt    Amount of exercise or physical activity: 3-5 days/week for an average of 2 mile jog over 35 minutes    Problems taking medications regularly: No    Medication side effects: none  Diet: regular (no restrictions)    Mr. Biggs routinely monitors his blood pressure at home and states his pressure readings have been increasing. He wonders if he should increase dose of current medication. Denies change in routine. Other medications have remained the same. Patient continues his exercise regimen.     Problem list and histories reviewed & adjusted, as indicated.  Additional history: as documented    Current Outpatient Prescriptions   Medication Sig Dispense Refill     probenecid (BENEMID) 500 MG tablet Take 1 tablet (500 mg) by mouth daily 90 tablet 3     lisinopril (PRINIVIL/ZESTRIL) 40 MG tablet Take 1 tablet (40 mg) by mouth daily 90 tablet 3     simvastatin (ZOCOR) 20 MG tablet Take 1 tablet (20 mg) by mouth At Bedtime 90 tablet 3     DiphenhydrAMINE HCl (BENADRYL PO) Take by mouth nightly as needed       COENZYME Q-10 PO Take 200 mg by mouth daily       Multiple Vitamins-Minerals (EYE VITAMINS PO) Take 1 tablet by mouth daily       Cyanocobalamin (VITAMIN B 12 PO) Take 1,000 mcg by mouth daily        ASPIRIN PO Take 81 mg by mouth daily       [DISCONTINUED] lisinopril (PRINIVIL/ZESTRIL) 40 MG tablet Take 1 tablet (40 mg) by mouth daily 90 tablet 1     [DISCONTINUED] simvastatin (ZOCOR) 20 MG tablet Take 1 tablet (20 mg) by mouth At Bedtime 90 tablet 2     Allergies   Allergen  "Reactions     Seasonal Allergies      Hay Fever     Reviewed and updated as needed this visit by clinical staffTobacco  Allergies  Meds  Soc Hx      Reviewed and updated as needed this visit by Provider       ROS:  Constitutional, HEENT, cardiovascular, pulmonary, gi and gu systems are negative, except as otherwise noted.    This document serves as a record of the services and decisions personally performed and made by Jose Arrieta MD. It was created on his/her behalf by Anitha Huggins, a trained medical scribe. The creation of this document is based the provider's statements to the medical scribe.  Scribe Anitha Huggins 8:43 AM, October 31, 2017     OBJECTIVE:   /86 (BP Location: Left arm, Patient Position: Chair, Cuff Size: Adult Regular)  Pulse 75  Temp 97.5  F (36.4  C) (Oral)  Ht 1.727 m (5' 8\")  Wt 80.5 kg (177 lb 8 oz)  SpO2 99%  BMI 26.99 kg/m2  Body mass index is 26.99 kg/(m^2).  Neck was supple without adenopathy or thyromegaly his carotids were normal without bruits  Chest clear to auscultation and percussion  Cardiovascular S1 and S2 are physiologic without murmurs or gallops  Abdomen bowel sounds were normal.  There is no palpable mass or organomegaly  Extremities nontender without any edema  Pulses pedal pulses are as described otherwise his pulses are bilaterally symmetrical throughout without bruits  Skin without significant abnormality     ASSESSMENT/PLAN:   1. Drug-induced gout of foot, unspecified chronicity, unspecified laterality  - probenecid (BENEMID) 500 MG tablet; Take 1 tablet (500 mg) by mouth daily  Dispense: 90 tablet; Refill: 3  - Pneumococcal vaccine 13 valent PCV13 IM (Prevnar) [38414]  - ADMIN: Vaccine, Initial (50223)  - FLU VACCINE, INCREASED ANTIGEN, PRESV FREE    2. Benign essential hypertension  Lisinopril is at highest recommended dose. Begin amlodipine at dose and regimen below. Monitor rate. If pressures do not decrease to below 140/90 in the next week, " contact me. Follow up in 3 weeks.   - lisinopril (PRINIVIL/ZESTRIL) 40 MG tablet; Take 1 tablet (40 mg) by mouth daily  Dispense: 90 tablet; Refill: 3  - amLODIPine (NORVASC) 5 MG tablet; Take 1 tablet (5 mg) by mouth daily  Dispense: 30 tablet; Refill: 1    3. Hyperlipidemia, unspecified hyperlipidemia type  - simvastatin (ZOCOR) 20 MG tablet; Take 1 tablet (20 mg) by mouth At Bedtime  Dispense: 90 tablet; Refill: 3    Jose Arrieta MD  [facility]    The information in this document, created by the medical scribe for me, accurately reflects the services I personally performed and the decisions made by me. I have reviewed and approved this document for accuracy prior to leaving the patient care area.  Jose Arrieta MD  8:43 AM, 10/31/17

## 2017-10-31 NOTE — NURSING NOTE
Screening Questionnaire for Adult Immunization    Are you sick today?   No   Do you have allergies to medications, food, a vaccine component or latex?   No   Have you ever had a serious reaction after receiving a vaccination?   No   Do you have a long-term health problem with heart disease, lung disease, asthma, kidney disease, metabolic disease (e.g. diabetes), anemia, or other blood disorder?   No   Do you have cancer, leukemia, HIV/AIDS, or any other immune system problem?   No   In the past 3 months, have you taken medications that affect  your immune system, such as prednisone, other steroids, or anticancer drugs; drugs for the treatment of rheumatoid arthritis, Crohn s disease, or psoriasis; or have you had radiation treatments?   No   Have you had a seizure, or a brain or other nervous system problem?   No   During the past year, have you received a transfusion of blood or blood     products, or been given immune (gamma) globulin or antiviral drug?   No   For women: Are you pregnant or is there a chance you could become        pregnant during the next month?   No   Have you received any vaccinations in the past 4 weeks?   No     Immunization questionnaire answers were all negative.        Per orders of Dr. Arrieta, injection of PCV 13 given by Arlene Hazel. Patient instructed to remain in clinic for 15 minutes afterwards, and to report any adverse reaction to me immediately.  Prior to injection verified patient identity using patient's name and date of birth.       Screening performed by Arlene Hazel on 10/31/2017 at 9:20 AM.

## 2017-10-31 NOTE — PROGRESS NOTES
Injectable Influenza Immunization Documentation    1.  Is the person to be vaccinated sick today?   No    2. Does the person to be vaccinated have an allergy to a component   of the vaccine?   No  Egg Allergy Algorithm Link    3. Has the person to be vaccinated ever had a serious reaction   to influenza vaccine in the past?   No    4. Has the person to be vaccinated ever had Guillain-Barré syndrome?   No    Form completed by Amarilis Ramirez CMA  Prior to injection verified patient identity using patient's name and date of birth.

## 2017-11-21 ENCOUNTER — OFFICE VISIT (OUTPATIENT)
Dept: FAMILY MEDICINE | Facility: CLINIC | Age: 77
End: 2017-11-21
Payer: COMMERCIAL

## 2017-11-21 VITALS
OXYGEN SATURATION: 98 % | BODY MASS INDEX: 26.52 KG/M2 | DIASTOLIC BLOOD PRESSURE: 76 MMHG | HEIGHT: 68 IN | SYSTOLIC BLOOD PRESSURE: 136 MMHG | HEART RATE: 77 BPM | TEMPERATURE: 98.3 F | WEIGHT: 175 LBS

## 2017-11-21 DIAGNOSIS — I10 BENIGN ESSENTIAL HYPERTENSION: ICD-10-CM

## 2017-11-21 DIAGNOSIS — R97.20 ELEVATED PROSTATE SPECIFIC ANTIGEN (PSA): Chronic | ICD-10-CM

## 2017-11-21 DIAGNOSIS — I10 BENIGN ESSENTIAL HYPERTENSION: Primary | Chronic | ICD-10-CM

## 2017-11-21 DIAGNOSIS — D12.6 BENIGN NEOPLASM OF COLON, UNSPECIFIED PART OF COLON: ICD-10-CM

## 2017-11-21 DIAGNOSIS — Z12.5 SCREENING FOR PROSTATE CANCER: ICD-10-CM

## 2017-11-21 DIAGNOSIS — E78.5 HYPERLIPIDEMIA, UNSPECIFIED HYPERLIPIDEMIA TYPE: Chronic | ICD-10-CM

## 2017-11-21 DIAGNOSIS — M10.279 DRUG-INDUCED GOUT OF FOOT, UNSPECIFIED CHRONICITY, UNSPECIFIED LATERALITY: Chronic | ICD-10-CM

## 2017-11-21 LAB
ANION GAP SERPL CALCULATED.3IONS-SCNC: 9 MMOL/L (ref 3–14)
BUN SERPL-MCNC: 25 MG/DL (ref 7–30)
CALCIUM SERPL-MCNC: 9.3 MG/DL (ref 8.5–10.1)
CHLORIDE SERPL-SCNC: 105 MMOL/L (ref 94–109)
CO2 SERPL-SCNC: 25 MMOL/L (ref 20–32)
CREAT SERPL-MCNC: 0.87 MG/DL (ref 0.66–1.25)
GFR SERPL CREATININE-BSD FRML MDRD: 85 ML/MIN/1.7M2
GLUCOSE SERPL-MCNC: 95 MG/DL (ref 70–99)
POTASSIUM SERPL-SCNC: 4.1 MMOL/L (ref 3.4–5.3)
PSA SERPL-ACNC: 9.5 UG/L (ref 0–4)
SODIUM SERPL-SCNC: 139 MMOL/L (ref 133–144)
URATE SERPL-MCNC: 5.7 MG/DL (ref 3.5–7.2)

## 2017-11-21 PROCEDURE — 80048 BASIC METABOLIC PNL TOTAL CA: CPT | Performed by: INTERNAL MEDICINE

## 2017-11-21 PROCEDURE — 36415 COLL VENOUS BLD VENIPUNCTURE: CPT | Performed by: INTERNAL MEDICINE

## 2017-11-21 PROCEDURE — 84550 ASSAY OF BLOOD/URIC ACID: CPT | Performed by: INTERNAL MEDICINE

## 2017-11-21 PROCEDURE — G0103 PSA SCREENING: HCPCS | Performed by: INTERNAL MEDICINE

## 2017-11-21 PROCEDURE — 99214 OFFICE O/P EST MOD 30 MIN: CPT | Performed by: INTERNAL MEDICINE

## 2017-11-21 RX ORDER — AMLODIPINE BESYLATE 5 MG/1
5 TABLET ORAL DAILY
Qty: 90 TABLET | Refills: 3 | Status: SHIPPED | OUTPATIENT
Start: 2017-11-21 | End: 2019-01-12

## 2017-11-21 NOTE — NURSING NOTE
"Chief Complaint   Patient presents with     Hypertension       Initial /85  Pulse 77  Temp 98.3  F (36.8  C) (Oral)  Ht 5' 8\" (1.727 m)  Wt 175 lb (79.4 kg)  SpO2 98%  BMI 26.61 kg/m2 Estimated body mass index is 26.61 kg/(m^2) as calculated from the following:    Height as of this encounter: 5' 8\" (1.727 m).    Weight as of this encounter: 175 lb (79.4 kg).  Medication Reconciliation: complete   Suzie Huitron CMA      "

## 2017-11-21 NOTE — MR AVS SNAPSHOT
After Visit Summary   11/21/2017    Spenser Biggs    MRN: [id number]           Patient Information     Date Of Birth          1940        Visit Information        Provider Department      11/21/2017 8:30 AM Jose Arrieta MD [facility]        Today's Diagnoses     Benign essential hypertension; goal < 140/90    -  1    Hyperlipidemia, unspecified hyperlipidemia type        Drug-induced gout of foot, unspecified chronicity, unspecified laterality        Elevated prostate specific antigen (PSA)        Screening for prostate cancer        Benign neoplasm of colon, unspecified part of colon        Benign essential hypertension          Care Instructions    Ophthalmologist: I suggest Dixon Jeffers or someone in his group          Follow-ups after your visit        Additional Services     GASTROENTEROLOGY ADULT REF PROCEDURE ONLY       Last Lab Result: Creatinine (mg/dL)       Date                     Value                 04/03/2017               1.05             ----------  Body mass index is 26.61 kg/(m^2).      Patient will be contacted to schedule procedure.     Please be aware that coverage of these services is subject to the terms and limitations of your health insurance plan.  Call member services at your health plan with any benefit or coverage questions.  Any procedures must be performed at a [place] facility OR coordinated by your clinic's referral office.    Please bring the following with you to your appointment:    (1) Any X-Rays, CTs or MRIs which have been performed.  Contact the facility where they were done to arrange for  prior to your scheduled appointment.    (2) List of current medications   (3) This referral request   (4) Any documents/labs given to you for this referral                  Your next 10 appointments already scheduled     Dec 14, 2017   Procedure with Vladimir Brito MD   [facility] ([facility])    [address] [facility]  "[place] [address] [phone]           [facility] is located at [address] [place] [address] [place]              Who to contact     If you have questions or need follow up information about today's clinic visit or your schedule please contact [facility] directly at [phone].  Normal or non-critical lab and imaging results will be communicated to you by MyChart, letter or phone within 4 business days after the clinic has received the results. If you do not hear from us within 7 days, please contact the clinic through SeeJayhart or phone. If you have a critical or abnormal lab result, we will notify you by phone as soon as possible.  Submit refill requests through [employer] or call your pharmacy and they will forward the refill request to us. Please allow 3 business days for your refill to be completed.          Additional Information About Your Visit        SeeJayhar[employer]olia Information     [employer] gives you secure access to your electronic health record. If you see a primary care provider, you can also send messages to your care team and make appointments. If you have questions, please call your primary care clinic.  If you do not have a primary care provider, please call [phone] and they will assist you.        Care EveryWhere ID     This is your Care EveryWhere ID. This could be used by other organizations to access your [place] medical records  [id number]        Your Vitals Were     Pulse Temperature Height Pulse Oximetry BMI (Body Mass Index)       77 98.3  F (36.8  C) (Oral) 5' 8\" (1.727 m) 98% 26.61 kg/m2        Blood Pressure from Last 3 Encounters:   11/21/17 136/76   10/31/17 150/80   06/16/17 132/86    Weight from Last 3 Encounters:   11/21/17 175 lb (79.4 kg)   10/31/17 177 lb 8 oz (80.5 kg)   06/16/17 174 lb 8 oz (79.2 kg)              We Performed the Following     **Uric acid FUTURE 2mo     Basic metabolic panel     GASTROENTEROLOGY ADULT REF PROCEDURE ONLY     Prostate " spec antigen screen          Where to get your medicines      These medications were sent to [id number] - [place], [place] - [address]  [address], [facility] [address]     Phone:  [phone]     amLODIPine 5 MG tablet          Primary Care Provider Office Phone # Fax #    Jose Arrieta [phone] [address] [facility] [address]  [facility] [address]        Equal Access to Services     ZANE JACQUES : Hadii aad ku hadasho Soomaali, waaxda luqadaha, qaybta kaalmada adeegyada, waxay idiin hayaan adeeg kharash la'hafsan . So [facility] [phone].    ATENCIÓN: Si habla español, tiene a delacruz disposición servicios gratuitos de asistencia lingüística. [facility] [phone].    We comply with applicable federal civil rights laws and [place] laws. We do not discriminate on the basis of race, color, national origin, age, disability, sex, sexual orientation, or gender identity.            Thank you!     Thank you for choosing [facility]  for your care. Our goal is always to provide you with excellent care. Hearing back from our patients is one way we can continue to improve our services. Please take a few minutes to complete the written survey that you may receive in the mail after your visit with us. Thank you!             Your Updated Medication List - Protect others around you: Learn how to safely use, store and throw away your medicines at www.disposemymeds.org.          This list is accurate as of: 11/21/17 11:59 PM.  Always use your most recent med list.                   Brand Name Dispense Instructions for use Diagnosis    amLODIPine 5 MG tablet    NORVASC    90 tablet    Take 1 tablet (5 mg) by mouth daily    Benign essential hypertension       ASPIRIN PO      Take 81 mg by mouth daily        BENADRYL PO      Take by mouth nightly as needed        COENZYME Q-10 PO      Take 200 mg by mouth daily        EYE VITAMINS PO      Take 1 tablet by mouth daily         lisinopril 40 MG tablet    PRINIVIL/ZESTRIL    90 tablet    Take 1 tablet (40 mg) by mouth daily    Benign essential hypertension       probenecid 500 MG tablet    BENEMID    90 tablet    Take 1 tablet (500 mg) by mouth daily    Drug-induced gout of foot, unspecified chronicity, unspecified laterality       simvastatin 20 MG tablet    ZOCOR    90 tablet    Take 1 tablet (20 mg) by mouth At Bedtime    Hyperlipidemia, unspecified hyperlipidemia type       VITAMIN B 12 PO      Take 1,000 mcg by mouth daily

## 2017-11-21 NOTE — PROGRESS NOTES
SUBJECTIVE:   Spenser Biggs is a 77 year old male who presents to clinic today for the following health issues:    Blood pressure follow up      Pt has been taking Norvasc. BP was elevated this morning. This past week BP has been normal with systolic numbers in the 130's and diastolic numbers in the 80's. Denies AE with medication or lower extremity edema. He has been keeping up with same activities such as jogging.Pt mentions he has been on lisinopril in the past.   BP Readings from Last 3 Encounters:   11/21/17 136/76   10/31/17 150/80   06/16/17 132/86     Taking all other medications as directed.    Pt requested a referral for an eye exam.     He also thinks he is due for a colonoscopy. His last colonoscopy was completed in 2010 and he had a tubular adenoma.     Problem list and histories reviewed & adjusted, as indicated.  Additional history: as documented    Current Outpatient Prescriptions   Medication Sig Dispense Refill     amLODIPine (NORVASC) 5 MG tablet Take 1 tablet (5 mg) by mouth daily 90 tablet 3     probenecid (BENEMID) 500 MG tablet Take 1 tablet (500 mg) by mouth daily 90 tablet 3     lisinopril (PRINIVIL/ZESTRIL) 40 MG tablet Take 1 tablet (40 mg) by mouth daily 90 tablet 3     simvastatin (ZOCOR) 20 MG tablet Take 1 tablet (20 mg) by mouth At Bedtime 90 tablet 3     COENZYME Q-10 PO Take 200 mg by mouth daily       Multiple Vitamins-Minerals (EYE VITAMINS PO) Take 1 tablet by mouth daily       Cyanocobalamin (VITAMIN B 12 PO) Take 1,000 mcg by mouth daily        ASPIRIN PO Take 81 mg by mouth daily       DiphenhydrAMINE HCl (BENADRYL PO) Take by mouth nightly as needed       Allergies   Allergen Reactions     Seasonal Allergies      Hay Fever       Reviewed and updated as needed this visit by clinical staff  Tobacco  Allergies  Meds  Problems  Med Hx  Surg Hx  Fam Hx  Soc Hx        Reviewed and updated as needed this visit by Provider         ROS:  Constitutional, HEENT, cardiovascular,  "pulmonary, gi and gu systems are negative, except as otherwise noted.    This document serves as a record of the services and decisions personally performed and made by Jose Arrieta MD. It was created on her behalf by Laverne Mckeon, a trained medical scribe. The creation of this document is based the provider's statements to the medical scribe.  Laverne Mckeon November 21, 2017 8:43 AM    OBJECTIVE:   /76  Pulse 77  Temp 98.3  F (36.8  C) (Oral)  Ht 1.727 m (5' 8\")  Wt 79.4 kg (175 lb)  SpO2 98%  BMI 26.61 kg/m2  Body mass index is 26.61 kg/(m^2).   Neck was supple without adenopathy or thyromegaly his carotids were normal without bruits  Chest clear to auscultation and percussion  Cardiovascular S1 and S2 are physiologic without murmurs or gallops  Abdomen bowel sounds were normal.  There is no palpable mass or organomegaly  Extremities nontender without any edema  Pulses pedal pulses are as described otherwise his pulses are bilaterally symmetrical throughout without bruits  Skin without significant abnormality    ASSESSMENT/PLAN:     1. Benign essential hypertension; goal < 140/90  Controlled. Continue same medication. Labs completed today included basic metabolic panel and uric acid.  Refill medication with 90 day supply for one years of refills.  - Basic metabolic panel  - amLODIPine (NORVASC) 5 MG tablet; Take 1 tablet (5 mg) by mouth daily  Dispense: 90 tablet; Refill: 3    2. Hyperlipidemia, unspecified hyperlipidemia type    3. Drug-induced gout of foot, unspecified chronicity, unspecified laterality  labs completed in office  - **Uric acid FUTURE 2mo    4. Elevated prostate specific antigen (PSA)  completed today  - Prostate spec antigen screen    5. Screening for prostate cancer  completed today  - Prostate spec antigen screen    6. Benign neoplasm of colon, unspecified part of colon  pt is to f/u with colonoscopy   - GASTROENTEROLOGY ADULT REF PROCEDURE ONLY    7. Benign essential " hypertension  Controlled. Continue same medication.   - Basic metabolic panel  - amLODIPine (NORVASC) 5 MG tablet; Take 1 tablet (5 mg) by mouth daily  Dispense: 90 tablet; Refill: 3    Advised pt follow up with ophthalmologist Dixon Jeffers or someone from his group.    Jose Arrieta MD  [facility]    The information in this document, created by the medical scribe for me, accurately reflects the services I personally performed and the decisions made by me. I have reviewed and approved this document for accuracy prior to leaving the patient care area.  Jose Arrieta MD  9:37 AM, 11/22/17

## 2017-12-14 ENCOUNTER — SURGERY (OUTPATIENT)
Age: 77
End: 2017-12-14

## 2017-12-14 ENCOUNTER — HOSPITAL ENCOUNTER (OUTPATIENT)
Facility: CLINIC | Age: 77
Discharge: HOME | End: 2017-12-14
Attending: SPECIALIST | Admitting: SPECIALIST
Payer: COMMERCIAL

## 2017-12-14 VITALS
SYSTOLIC BLOOD PRESSURE: 139 MMHG | RESPIRATION RATE: 32 BRPM | DIASTOLIC BLOOD PRESSURE: 91 MMHG | BODY MASS INDEX: 26.83 KG/M2 | OXYGEN SATURATION: 98 % | HEIGHT: 68 IN | WEIGHT: 177 LBS

## 2017-12-14 LAB — COLONOSCOPY: NORMAL

## 2017-12-14 PROCEDURE — 99153 MOD SED SAME PHYS/QHP EA: CPT

## 2017-12-14 PROCEDURE — 45385 COLONOSCOPY W/LESION REMOVAL: CPT | Performed by: SPECIALIST

## 2017-12-14 PROCEDURE — G0500 MOD SEDAT ENDO SERVICE >5YRS: HCPCS | Performed by: SPECIALIST

## 2017-12-14 PROCEDURE — 88305 TISSUE EXAM BY PATHOLOGIST: CPT | Performed by: SPECIALIST

## 2017-12-14 PROCEDURE — 25000128 H RX IP 250 OP 636: Performed by: SPECIALIST

## 2017-12-14 PROCEDURE — 88305 TISSUE EXAM BY PATHOLOGIST: CPT | Mod: 26 | Performed by: SPECIALIST

## 2017-12-14 RX ORDER — FENTANYL CITRATE 50 UG/ML
INJECTION, SOLUTION INTRAMUSCULAR; INTRAVENOUS PRN
Status: DISCONTINUED | OUTPATIENT
Start: 2017-12-14 | End: 2017-12-14 | Stop reason: HOSPADM

## 2017-12-14 RX ORDER — ONDANSETRON 2 MG/ML
4 INJECTION INTRAMUSCULAR; INTRAVENOUS
Status: DISCONTINUED | OUTPATIENT
Start: 2017-12-14 | End: 2017-12-14 | Stop reason: HOSPADM

## 2017-12-14 RX ORDER — LIDOCAINE 40 MG/G
CREAM TOPICAL
Status: DISCONTINUED | OUTPATIENT
Start: 2017-12-14 | End: 2017-12-14 | Stop reason: HOSPADM

## 2017-12-14 RX ADMIN — MIDAZOLAM 0.5 MG: 1 INJECTION INTRAMUSCULAR; INTRAVENOUS at 09:20

## 2017-12-14 RX ADMIN — FENTANYL CITRATE 50 MCG: 50 INJECTION, SOLUTION INTRAMUSCULAR; INTRAVENOUS at 09:14

## 2017-12-14 RX ADMIN — FENTANYL CITRATE 25 MCG: 50 INJECTION, SOLUTION INTRAMUSCULAR; INTRAVENOUS at 09:20

## 2017-12-14 RX ADMIN — MIDAZOLAM 0.5 MG: 1 INJECTION INTRAMUSCULAR; INTRAVENOUS at 09:27

## 2017-12-14 RX ADMIN — MIDAZOLAM 1 MG: 1 INJECTION INTRAMUSCULAR; INTRAVENOUS at 09:14

## 2017-12-14 RX ADMIN — FENTANYL CITRATE 25 MCG: 50 INJECTION, SOLUTION INTRAMUSCULAR; INTRAVENOUS at 09:26

## 2017-12-14 NOTE — BRIEF OP NOTE
[facility] Brief Operative Note    Pre-operative diagnosis: screening   Post-operative diagnosis transverse colon polyp, diverticulosis     Procedure: Procedure(s):  colonoscopy - Wound Class: II-Clean Contaminated   Surgeon(s): Surgeon(s) and Role:     * Vladimri Brito MD - Primary   Estimated blood loss: * No values recorded between 12/14/2017 12:00 AM and 12/14/2017  9:46 AM *    Specimens:   ID Type Source Tests Collected by Time Destination   A :  Polyp Large Intestine, Transverse SURGICAL PATHOLOGY EXAM Ivet Redd RN 12/14/2017  9:36 AM       Findings: Please see ProVation procedure note in Chart Review

## 2017-12-14 NOTE — H&P
Pre-Endoscopy History and Physical     Spenser Biggs MRN# [id number]   YOB: 1940 Age: 77 year old     Date of Procedure: 12/14/2017  Primary care provider: Jose Arrieta  Type of Endoscopy: Colonoscopy with possible biopsy, possible polypectomy  Reason for Procedure: history of polyps  Type of Anesthesia Anticipated: Conscious Sedation    HPI:    Spenser is a 77 year old male who will be undergoing the above procedure.      A history and physical has been performed. The patient's medications and allergies have been reviewed. The risks and benefits of the procedure and the sedation options and risks were discussed with the patient.  All questions were answered and informed consent was obtained.      He denies a personal or family history of anesthesia complications or bleeding disorders.     Patient Active Problem List   Diagnosis     Benign essential hypertension; goal < 140/90     Hyperlipidemia, unspecified hyperlipidemia type     Drug-induced gout of foot, unspecified chronicity, unspecified laterality     Elevated prostate specific antigen (PSA)        Past Medical History:   Diagnosis Date     Hyperlipidemia      Hypertension         Past Surgical History:   Procedure Laterality Date     APPENDECTOMY      at age 7     ENT SURGERY      tonsilectomy at age 5       Social History   Substance Use Topics     Smoking status: Former Smoker     Smokeless tobacco: Never Used      Comment: 2 cigars per year occasionally     Alcohol use 0.0 oz/week     0 Standard drinks or equivalent per week      Comment: a beer at night       Family History   Problem Relation Age of Onset     CEREBROVASCULAR DISEASE Mother      DIABETES Father      Coronary Artery Disease Father      Obesity Father      Uterine Cancer Maternal Grandmother      OSTEOPOROSIS Sister      Genetic Disorder Daughter        Prior to Admission medications    Medication Sig Start Date End Date Taking? Authorizing Provider   amLODIPine (NORVASC) 5  "MG tablet Take 1 tablet (5 mg) by mouth daily 11/21/17  Yes Jose Arrieta MD   probenecid (BENEMID) 500 MG tablet Take 1 tablet (500 mg) by mouth daily 10/31/17  Yes Jose Arrieta MD   lisinopril (PRINIVIL/ZESTRIL) 40 MG tablet Take 1 tablet (40 mg) by mouth daily 10/31/17  Yes Jose Arrieta MD   simvastatin (ZOCOR) 20 MG tablet Take 1 tablet (20 mg) by mouth At Bedtime 10/31/17  Yes Jose Arrieta MD   DiphenhydrAMINE HCl (BENADRYL PO) Take by mouth nightly as needed   Yes Reported, Patient   COENZYME Q-10 PO Take 200 mg by mouth daily   Yes Reported, Patient   Multiple Vitamins-Minerals (EYE VITAMINS PO) Take 1 tablet by mouth daily   Yes Reported, Patient   Cyanocobalamin (VITAMIN B 12 PO) Take 1,000 mcg by mouth daily    Yes Reported, Patient   ASPIRIN PO Take 81 mg by mouth daily    Reported, Patient       Allergies   Allergen Reactions     Seasonal Allergies      Hay Fever        REVIEW OF SYSTEMS:   5 point ROS negative except as noted above in HPI, including Gen., Resp., CV, GI &  system review.    PHYSICAL EXAM:   BP (!) 137/104  Resp 16  Ht 1.727 m (5' 8\")  Wt 80.3 kg (177 lb)  SpO2 98%  BMI 26.91 kg/m2 Estimated body mass index is 26.91 kg/(m^2) as calculated from the following:    Height as of this encounter: 1.727 m (5' 8\").    Weight as of this encounter: 80.3 kg (177 lb).   GENERAL APPEARANCE: alert, and oriented  MENTAL STATUS: alert  AIRWAY EXAM: Mallampatti Class II (visualization of the soft palate, fauces, and uvula)  RESP: lungs clear to auscultation - no rales, rhonchi or wheezes  CV: regular rates and rhythm  DIAGNOSTICS:    Not indicated    IMPRESSION   ASA Class 2 - Mild systemic disease    PLAN:   Plan for Colonoscopy with possible biopsy, possible polypectomy. We discussed the risks, benefits and alternatives and the patient wished to proceed.    The above has been forwarded to the consulting provider.      Signed Electronically by: Vladimir Brito  December 14, " 2017

## 2017-12-15 LAB — COPATH REPORT: NORMAL

## 2018-01-11 DIAGNOSIS — E78.5 HYPERLIPIDEMIA, UNSPECIFIED HYPERLIPIDEMIA TYPE: ICD-10-CM

## 2018-01-12 RX ORDER — SIMVASTATIN 20 MG
TABLET ORAL
Qty: 90 TABLET | Refills: 2 | OUTPATIENT
Start: 2018-01-12

## 2018-02-04 ENCOUNTER — HOSPITAL ENCOUNTER (EMERGENCY)
Facility: CLINIC | Age: 78
Discharge: HOME | End: 2018-02-04
Attending: EMERGENCY MEDICINE | Admitting: EMERGENCY MEDICINE
Payer: COMMERCIAL

## 2018-02-04 ENCOUNTER — APPOINTMENT (OUTPATIENT)
Dept: GENERAL RADIOLOGY | Facility: CLINIC | Age: 78
End: 2018-02-04
Attending: EMERGENCY MEDICINE
Payer: COMMERCIAL

## 2018-02-04 VITALS
HEIGHT: 68 IN | WEIGHT: 175 LBS | RESPIRATION RATE: 16 BRPM | SYSTOLIC BLOOD PRESSURE: 176 MMHG | DIASTOLIC BLOOD PRESSURE: 106 MMHG | TEMPERATURE: 97.7 F | BODY MASS INDEX: 26.52 KG/M2 | OXYGEN SATURATION: 99 %

## 2018-02-04 DIAGNOSIS — S32.592A PUBIC RAMUS FRACTURE, LEFT, CLOSED, INITIAL ENCOUNTER (H): ICD-10-CM

## 2018-02-04 PROCEDURE — 25000132 ZZH RX MED GY IP 250 OP 250 PS 637: Performed by: EMERGENCY MEDICINE

## 2018-02-04 PROCEDURE — 72170 X-RAY EXAM OF PELVIS: CPT

## 2018-02-04 PROCEDURE — 99283 EMERGENCY DEPT VISIT LOW MDM: CPT

## 2018-02-04 RX ORDER — ASPIRIN 81 MG
100 TABLET, DELAYED RELEASE (ENTERIC COATED) ORAL DAILY
Qty: 30 TABLET | Refills: 0 | Status: SHIPPED | OUTPATIENT
Start: 2018-02-04 | End: 2018-05-08

## 2018-02-04 RX ORDER — ACETAMINOPHEN 500 MG
1000 TABLET ORAL ONCE
Status: COMPLETED | OUTPATIENT
Start: 2018-02-04 | End: 2018-02-04

## 2018-02-04 RX ORDER — HYDROCODONE BITARTRATE AND ACETAMINOPHEN 5; 325 MG/1; MG/1
1 TABLET ORAL EVERY 4 HOURS PRN
Qty: 15 TABLET | Refills: 0 | Status: SHIPPED | OUTPATIENT
Start: 2018-02-04 | End: 2018-05-08

## 2018-02-04 RX ADMIN — ACETAMINOPHEN 1000 MG: 500 TABLET ORAL at 13:28

## 2018-02-04 ASSESSMENT — ENCOUNTER SYMPTOMS
DYSURIA: 0
FEVER: 0
COUGH: 0
BACK PAIN: 0
CHILLS: 0
CONSTIPATION: 1
FREQUENCY: 0

## 2018-02-04 NOTE — DISCHARGE INSTRUCTIONS
Pelvic Fracture  You have a break or fracture of the pelvic bone. Your fracture is stable because the bones are not out of place and there are no signs of serious internal bleeding. No surgery or other special treatment will be needed. As long as your pain is controlled by oral medicine, you can be treated at home. A broken pelvis will take about 6 to 8 weeks to heal. It can be painful to move for the first 3 to 4 weeks.     Home care    Bed rest and pain medicine is the only treatment required. Stay in bed for the first 2 to 3 days to reduce pain with movement. During this time, you will need help bathing, using the bathroom, and meals. A bedpan or bedside commode may be easier to use than getting up to use the bathroom. As soon as possible, begin sitting or walking to avoid problems with prolonged bed rest (muscle weakness, worsening back stiffness and pain, blood clots in the legs). A walker, crutches, or cane will make walking easier in the first 3 weeks.    Home healthcare may be available to provide in-home nursing services. Check with your healthcare provider, the hospital s social service department or a private nursing agency to see if your insurance will cover this kind of care.    During the first 2 days after the injury there will probably be localized swelling and bruising on the skin over the pelvis. During this time apply an ice pack to the painful area for no more than 20 minutes every 1 to 2 hours to reduce swelling and pain. Wrap the ice pack in a thin towel. Never put ice or an ice pack directly on your skin.    You may use over-the-counter pain medicine to control pain, unless another medicine was prescribed. Take pain medicine as directed. Call your healthcare provider if your pain is not well-controlled. A dose change or stronger medicine may be needed. If you have chronic liver or kidney disease, talk with your healthcare provider before using pain medicine.  Follow-up care  Follow up with  your healthcare provider, or as advised. This will help to make sure the bone is healing properly.  If X-rays were taken, you will be told of any new findings that may affect your care.  Call 911  Call 911 if you have:    Swelling, pain or redness below your knee    Chest pain or shortness of breath  When to seek medical advice  Call your healthcare provider right away if any of these occur:    Pain becomes worse or you are unable to walk with help for more than three days    Blood in your urine or bleeding from the urethra (the opening where urine comes out)    Difficulty passing urine or unable to pass stool due to pain    Fever of 100.4 F (38 C) or above lasting for 24 to 48 hours  Date Last Reviewed: 12/3/2015    4913-8036 [employer]. [address] [place] [address], [place] [address]. All rights reserved. This information is not intended as a substitute for professional medical care. Always follow your healthcare professional's instructions.

## 2018-02-04 NOTE — ED AVS SNAPSHOT
Emergency Department    [address] [place] [address]    Phone:  [phone]    Fax:  [phone]                                       Spenser Biggs   MRN: [id number]    Department:   Emergency Department   Date of Visit:  2/4/2018           After Visit Summary Signature Page     I have received my discharge instructions, and my questions have been answered. I have discussed any challenges I see with this plan with the nurse or doctor.    ..........................................................................................................................................  Patient/Patient Representative Signature      ..........................................................................................................................................  Patient Representative Print Name and Relationship to Patient    ..................................................               ................................................  Date                                            Time    ..........................................................................................................................................  Reviewed by Signature/Title    ...................................................              ..............................................  Date                                                            Time

## 2018-02-04 NOTE — ED AVS SNAPSHOT
Emergency Department    [address] [facility] [address]    Phone:  [phone]    Fax:  [phone]                                       Spenser Biggs   MRN: [id number]    Department:   Emergency Department   Date of Visit:  2/4/2018           Patient Information     Date Of Birth          1940        Your diagnoses for this visit were:     Pubic ramus fracture, left, closed, initial encounter (H)        You were seen by Wally Ham MD.      Follow-up Information     Follow up with Jose Arrieta MD.    Specialty:  Internal Medicine    Contact information:    [address] [place] [address] [phone]          Discharge Instructions         Pelvic Fracture  You have a break or fracture of the pelvic bone. Your fracture is stable because the bones are not out of place and there are no signs of serious internal bleeding. No surgery or other special treatment will be needed. As long as your pain is controlled by oral medicine, you can be treated at home. A broken pelvis will take about 6 to 8 weeks to heal. It can be painful to move for the first 3 to 4 weeks.     Home care    Bed rest and pain medicine is the only treatment required. Stay in bed for the first 2 to 3 days to reduce pain with movement. During this time, you will need help bathing, using the bathroom, and meals. A bedpan or bedside commode may be easier to use than getting up to use the bathroom. As soon as possible, begin sitting or walking to avoid problems with prolonged bed rest (muscle weakness, worsening back stiffness and pain, blood clots in the legs). A walker, crutches, or cane will make walking easier in the first 3 weeks.    Home healthcare may be available to provide in-home nursing services. Check with your healthcare provider, the hospital s social service department or a private nursing agency to see if your insurance will cover this kind of care.    During the first 2 days after the  injury there will probably be localized swelling and bruising on the skin over the pelvis. During this time apply an ice pack to the painful area for no more than 20 minutes every 1 to 2 hours to reduce swelling and pain. Wrap the ice pack in a thin towel. Never put ice or an ice pack directly on your skin.    You may use over-the-counter pain medicine to control pain, unless another medicine was prescribed. Take pain medicine as directed. Call your healthcare provider if your pain is not well-controlled. A dose change or stronger medicine may be needed. If you have chronic liver or kidney disease, talk with your healthcare provider before using pain medicine.  Follow-up care  Follow up with your healthcare provider, or as advised. This will help to make sure the bone is healing properly.  If X-rays were taken, you will be told of any new findings that may affect your care.  Call 911  Call 911 if you have:    Swelling, pain or redness below your knee    Chest pain or shortness of breath  When to seek medical advice  Call your healthcare provider right away if any of these occur:    Pain becomes worse or you are unable to walk with help for more than three days    Blood in your urine or bleeding from the urethra (the opening where urine comes out)    Difficulty passing urine or unable to pass stool due to pain    Fever of 100.4 F (38 C) or above lasting for 24 to 48 hours  Date Last Reviewed: 12/3/2015    4507-6704 [employer]. [address] [place] [address]. All rights reserved. This information is not intended as a substitute for professional medical care. Always follow your healthcare professional's instructions.          24 Hour Appointment Hotline       To make an appointment at any [facility], call [phone] ([phone]). If you don't have a family doctor or clinic, we will help you find one. [facility] clinics are conveniently located to serve the needs of you and your  family.             Review of your medicines      START taking        Dose / Directions Last dose taken    docusate sodium 100 MG tablet   Commonly known as:  COLACE   Dose:  100 mg   Quantity:  30 tablet        Take 100 mg by mouth daily   Refills:  0        HYDROcodone-acetaminophen 5-325 MG per tablet   Commonly known as:  NORCO   Dose:  1 tablet   Quantity:  15 tablet        Take 1 tablet by mouth every 4 hours as needed for pain   Refills:  0          Our records show that you are taking the medicines listed below. If these are incorrect, please call your family doctor or clinic.        Dose / Directions Last dose taken    amLODIPine 5 MG tablet   Commonly known as:  NORVASC   Dose:  5 mg   Quantity:  90 tablet        Take 1 tablet (5 mg) by mouth daily   Refills:  3        ASPIRIN PO   Dose:  81 mg        Take 81 mg by mouth daily   Refills:  0        BENADRYL PO        Take by mouth nightly as needed   Refills:  0        COENZYME Q-10 PO   Dose:  200 mg        Take 200 mg by mouth daily   Refills:  0        EYE VITAMINS PO   Dose:  1 tablet        Take 1 tablet by mouth daily   Refills:  0        lisinopril 40 MG tablet   Commonly known as:  PRINIVIL/ZESTRIL   Dose:  40 mg   Quantity:  90 tablet        Take 1 tablet (40 mg) by mouth daily   Refills:  3        probenecid 500 MG tablet   Commonly known as:  BENEMID   Dose:  500 mg   Quantity:  90 tablet        Take 1 tablet (500 mg) by mouth daily   Refills:  3        simvastatin 20 MG tablet   Commonly known as:  ZOCOR   Dose:  20 mg   Quantity:  90 tablet        Take 1 tablet (20 mg) by mouth At Bedtime   Refills:  3        VITAMIN B 12 PO   Dose:  1000 mcg        Take 1,000 mcg by mouth daily   Refills:  0                Prescriptions were sent or printed at these locations (2 Prescriptions)                   Other Prescriptions                Printed at Department/Unit printer (2 of 2)         HYDROcodone-acetaminophen (NORCO) 5-325 MG per tablet                docusate sodium (COLACE) 100 MG tablet                Procedures and tests performed during your visit     Pelvis XR, 1-2 views      Orders Needing Specimen Collection     None      Pending Results     Date and Time Order Name Status Description    2/4/2018 1323 Pelvis XR, 1-2 views Preliminary             Pending Culture Results     No orders found from 2/2/2018 to 2/5/2018.            Pending Results Instructions     If you had any lab results that were not finalized at the time of your Discharge, you can call the ED Lab Result RN at [phone]. You will be contacted by this team for any positive Lab results or changes in treatment. The nurses are available 7 days a week from 10A to 6:30P.  You can leave a message 24 hours per day and they will return your call.        Test Results From Your Hospital Stay        2/4/2018  1:54 PM      Narrative     PELVIS ONE TO TWO VIEWS   2/4/2018 1:43 PM     HISTORY: Fall.    COMPARISON: None.        Impression     IMPRESSION: Left inferior and superior pubic rami fractures are  present which appear relatively nondisplaced. The proximal femurs  appear intact. Vascular calcifications noted.                Clinical Quality Measure: Blood Pressure Screening     Your blood pressure was checked while you were in the emergency department today. The last reading we obtained was  BP: (!) 176/106 . Please read the guidelines below about what these numbers mean and what you should do about them.  If your systolic blood pressure (the top number) is less than 120 and your diastolic blood pressure (the bottom number) is less than 80, then your blood pressure is normal. There is nothing more that you need to do about it.  If your systolic blood pressure (the top number) is 120-139 or your diastolic blood pressure (the bottom number) is 80-89, your blood pressure may be higher than it should be. You should have your blood pressure rechecked within a year by a primary care provider.  If  your systolic blood pressure (the top number) is 140 or greater or your diastolic blood pressure (the bottom number) is 90 or greater, you may have high blood pressure. High blood pressure is treatable, but if left untreated over time it can put you at risk for heart attack, stroke, or kidney failure. You should have your blood pressure rechecked by a primary care provider within the next 4 weeks.  If your provider in the emergency department today gave you specific instructions to follow-up with your doctor or provider even sooner than that, you should follow that instruction and not wait for up to 4 weeks for your follow-up visit.        Thank you for choosing [facility]       Thank you for choosing [facility] for your care. Our goal is always to provide you with excellent care. Hearing back from our patients is one way we can continue to improve our services. Please take a few minutes to complete the written survey that you may receive in the mail after you visit with us. Thank you!        [facility] Information     [employer] gives you secure access to your electronic health record. If you see a primary care provider, you can also send messages to your care team and make appointments. If you have questions, please call your primary care clinic.  If you do not have a primary care provider, please call [phone] and they will assist you.        Care EveryWhere ID     This is your Care EveryWhere ID. This could be used by other organizations to access your [facility] medical records  [id number]        Equal Access to Services     ZANE JACQUES : Gildardo Grimaldo, waaxda lushane, qaybta liaaldave díaz. So [facility] [phone].    ATENCIÓN: Si habla español, tiene a delacruz disposición servicios gratuitos de asistencia lingüística. Llame al [phone].    We comply with applicable federal civil rights laws and [place] laws. We do not discriminate on the basis of race,  color, national origin, age, disability, sex, sexual orientation, or gender identity.            After Visit Summary       This is your record. Keep this with you and show to your community pharmacist(s) and doctor(s) at your next visit.

## 2018-02-04 NOTE — ED PROVIDER NOTES
History     Chief Complaint:  Fall, Groin pain      HPI   Spenser Biggs is a 77 year old male who presents with pelvic pain after a fall.  The patient reports he fell forward to the ground about 2 weeks ago.  He was able to get up and ambulate, although did have some pain in his groin with weight bearing.  He had no other apparent injuries and did not hit his head, therefore did not come in for evaluation.  His groin/pelvic pain with weight bearing has actually worsened since that time, prompting him to come in today.  His left side hurts slightly more than the right.  He has no pain in his back, only lower more in his lower buttock.  He is urinating normally but is constipated with last bowel movement a couple days ago.  He has been taking Aleve, last dose yesterday, without significant improvement.  He denies any fevers, chills, or cough.      Allergies:  No known drug allergies.      Medications:    Amlodipine  Probenecid  Lisinopril  Simvastatin  Diphenhydramine  Coenzyme Q-10   Multivitamin  Vitamin B-12  Aspirin     Past Medical History:    Hyperlipidemia  Hypertension   Benign prostatic hypertrophy   Gout     Past Surgical History:    Appendectomy  Tonsillectomy     Family History:    Stroke - mother  Diabetes - father  Coronary artery disease - father  Obesity - father  Osteoporosis - sister    Social History:  Marital Status:   Presents to the ED alone.  Tobacco Use: Occasional cigar smoker  Alcohol Use: Daily beer   PCP: Jose Arrieta     Review of Systems   Constitutional: Negative for chills and fever.   Respiratory: Negative for cough.    Gastrointestinal: Positive for constipation.   Genitourinary: Negative for dysuria and frequency.   Musculoskeletal: Positive for gait problem. Negative for back pain.        Positive for pelvic pain.   All other systems reviewed and are negative.    Physical Exam   First Vitals:  BP: (!) 176/106  Heart Rate: 116  Temp: 97.7  F (36.5  C)  Resp: 18  Height:  "172.7 cm (5' 8\")  Weight: 79.4 kg (175 lb)  SpO2: 99 %    Physical Exam  Constitutional: The patient is oriented to person, place, and time.   HENT:   Head: Atraumatic  Right Ear: Normal  Left Ear: Normal  Nose: Nose normal.   Mouth/Throat: Oropharynx is clear and moist. No erythema or exudate.   Eyes: Conjunctivae and EOM are normal. Pupils are equal, round, and reactive to light. No discharge  Neck: Normal range of motion. Neck supple.   Cardiovascular: Normal rate, regular rhythm, no murmur gallops or rubs. Intact distal pulses.    Pulmonary/Chest: CTA bilaterally. No wheezes rale or rhonchi.  Abdominal: Soft. Non tender.  No masses   Musculoskeletal: No edema. Tenderness with palpation of the pubic rami. Normal range of motion of the hips.  Lymphadenopathy:     The patient has no cervical adenopathy.   Neurological: The patient is alert and oriented to person, place, and time. The patient has normal strength and normal reflexes. No cranial nerve deficit. Coordination normal.   Skin: Skin is warm and dry. No rash noted. The patient is not diaphoretic.   Psychiatric: The patient has a normal mood and affect.       Emergency Department Course     Imaging:  Pelvis x-ray (1-2 views):  Left inferior and superior pubic rami fractures are present which appear relatively nondisplaced. The proximal femurs appear intact. Vascular calcifications noted.  Report per radiology.     Radiographic findings were communicated with the patient who voiced understanding of the findings.    Interventions:  (2530) Tylenol, 1000 mg, PO     Emergency Department Course:  Nursing notes and vitals reviewed.  I performed an exam of the patient as documented above.     The patient was sent for a pelvis x-ray while in the emergency department, findings above.      Findings and plan explained to the patient . Patient discharged home with instructions regarding supportive care, medications, and reasons to return. The importance of close follow-up " was reviewed. The patient was prescribed Norco and Colace.     Impression & Plan      Medical Decision Making:  Patient presents with pelvis pain and difficult walking.  Notes he fell about 2 weeks ago, was never seen at that time.  On exam he is mildly tender over the pubic rami.  X-ray does reveal left superior and inferior nondisplaced pubic ramus fracture.  Patient is able to stand and ambulate with a walker and I feel he can be safely discharged to home.  Tylenol/Motrin for pain, Norco for severe pain.  Follow up with his primary physician, return for problems.      Diagnosis:    ICD-10-CM    1. Pubic ramus fracture, left, closed, initial encounter (H) S32.592A      Disposition:  Discharged to home.     Discharge Medications:  New Prescriptions    DOCUSATE SODIUM (COLACE) 100 MG TABLET    Take 100 mg by mouth daily    HYDROCODONE-ACETAMINOPHEN (NORCO) 5-325 MG PER TABLET    Take 1 tablet by mouth every 4 hours as needed for pain       I, Rena Briseno, am serving as a scribe on 2/4/2018 at 1:20 PM to personally document services performed by Dr. Ham based on my observations and the provider's statements to me.    Rena Briseno  2/4/2018    EMERGENCY DEPARTMENT       Wally Ham MD  02/04/18 0851

## 2018-02-21 ENCOUNTER — MEDICAL CORRESPONDENCE (OUTPATIENT)
Dept: HEALTH INFORMATION MANAGEMENT | Facility: CLINIC | Age: 78
End: 2018-02-21

## 2018-03-08 ENCOUNTER — MEDICAL CORRESPONDENCE (OUTPATIENT)
Dept: HEALTH INFORMATION MANAGEMENT | Facility: CLINIC | Age: 78
End: 2018-03-08

## 2018-03-09 ENCOUNTER — TELEPHONE (OUTPATIENT)
Dept: FAMILY MEDICINE | Facility: CLINIC | Age: 78
End: 2018-03-09

## 2018-03-09 NOTE — TELEPHONE ENCOUNTER
Reason for Call:  Other Referral for Urologist     Detailed comments: The patient is in the hospital for a fractured Hip/Pelvis in [facility] and he has some renal problems   Cath in since 2/6  Today it was taken out about 9:30 Am and was put back in at about 4pm  The patient had about 450 CC of Urine in his bladder at that time   He has had it out twice and was unable to urinate so it was put back in        Phone Number Patient can be reached at: Home number on file [phone] (home)    Best Time: anytime  Please call him to let him know what is being done    Can we leave a detailed message on this number? YES    Call taken on 3/9/2018 at 4:44 PM by Jenny Castillo

## 2018-03-15 ENCOUNTER — MEDICAL CORRESPONDENCE (OUTPATIENT)
Dept: HEALTH INFORMATION MANAGEMENT | Facility: CLINIC | Age: 78
End: 2018-03-15

## 2018-03-18 DIAGNOSIS — Z53.9 DIAGNOSIS NOT YET DEFINED: Primary | ICD-10-CM

## 2018-03-18 PROCEDURE — G0180 MD CERTIFICATION HHA PATIENT: HCPCS | Performed by: INTERNAL MEDICINE

## 2018-03-21 ENCOUNTER — RADIANT APPOINTMENT (OUTPATIENT)
Dept: GENERAL RADIOLOGY | Facility: CLINIC | Age: 78
End: 2018-03-21
Attending: INTERNAL MEDICINE
Payer: COMMERCIAL

## 2018-03-21 ENCOUNTER — OFFICE VISIT (OUTPATIENT)
Dept: FAMILY MEDICINE | Facility: CLINIC | Age: 78
End: 2018-03-21
Payer: COMMERCIAL

## 2018-03-21 VITALS
SYSTOLIC BLOOD PRESSURE: 114 MMHG | HEIGHT: 68 IN | WEIGHT: 150 LBS | TEMPERATURE: 97.4 F | BODY MASS INDEX: 22.73 KG/M2 | HEART RATE: 92 BPM | DIASTOLIC BLOOD PRESSURE: 76 MMHG

## 2018-03-21 DIAGNOSIS — S32.82XA MULTIPLE CLOSED FRACTURES OF PELVIS WITHOUT DISRUPTION OF PELVIC RING, INITIAL ENCOUNTER (H): Primary | ICD-10-CM

## 2018-03-21 DIAGNOSIS — S32.82XA MULTIPLE CLOSED FRACTURES OF PELVIS WITHOUT DISRUPTION OF PELVIC RING, INITIAL ENCOUNTER (H): ICD-10-CM

## 2018-03-21 DIAGNOSIS — J18.9 PNEUMONIA OF RIGHT LOWER LOBE DUE TO INFECTIOUS ORGANISM: ICD-10-CM

## 2018-03-21 DIAGNOSIS — K21.00 GASTROESOPHAGEAL REFLUX DISEASE WITH ESOPHAGITIS: ICD-10-CM

## 2018-03-21 DIAGNOSIS — M10.279 DRUG-INDUCED GOUT OF FOOT, UNSPECIFIED CHRONICITY, UNSPECIFIED LATERALITY: Chronic | ICD-10-CM

## 2018-03-21 DIAGNOSIS — R97.20 ELEVATED PROSTATE SPECIFIC ANTIGEN (PSA): Chronic | ICD-10-CM

## 2018-03-21 DIAGNOSIS — N40.0 PROSTATISM: ICD-10-CM

## 2018-03-21 DIAGNOSIS — R33.9 URINARY RETENTION: ICD-10-CM

## 2018-03-21 DIAGNOSIS — E78.5 HYPERLIPIDEMIA, UNSPECIFIED HYPERLIPIDEMIA TYPE: Chronic | ICD-10-CM

## 2018-03-21 DIAGNOSIS — I82.402 ACUTE DEEP VEIN THROMBOSIS (DVT) OF LEFT LOWER EXTREMITY, UNSPECIFIED VEIN (H): ICD-10-CM

## 2018-03-21 DIAGNOSIS — I10 BENIGN ESSENTIAL HYPERTENSION: Chronic | ICD-10-CM

## 2018-03-21 DIAGNOSIS — M10.9 ACUTE GOUTY ARTHRITIS: ICD-10-CM

## 2018-03-21 LAB
CRYSTALS SNV MICRO: ABNORMAL
ERYTHROCYTE [DISTWIDTH] IN BLOOD BY AUTOMATED COUNT: 13.1 % (ref 10–15)
HCT VFR BLD AUTO: 38.5 % (ref 40–53)
HGB BLD-MCNC: 12.3 G/DL (ref 13.3–17.7)
MCH RBC QN AUTO: 30.5 PG (ref 26.5–33)
MCHC RBC AUTO-ENTMCNC: 31.9 G/DL (ref 31.5–36.5)
MCV RBC AUTO: 96 FL (ref 78–100)
PLATELET # BLD AUTO: 419 10E9/L (ref 150–450)
RBC # BLD AUTO: 4.03 10E12/L (ref 4.4–5.9)
WBC # BLD AUTO: 9.9 10E9/L (ref 4–11)

## 2018-03-21 PROCEDURE — 80048 BASIC METABOLIC PNL TOTAL CA: CPT | Performed by: INTERNAL MEDICINE

## 2018-03-21 PROCEDURE — 73560 X-RAY EXAM OF KNEE 1 OR 2: CPT | Mod: LT

## 2018-03-21 PROCEDURE — 36415 COLL VENOUS BLD VENIPUNCTURE: CPT | Performed by: INTERNAL MEDICINE

## 2018-03-21 PROCEDURE — 99214 OFFICE O/P EST MOD 30 MIN: CPT | Mod: 25 | Performed by: INTERNAL MEDICINE

## 2018-03-21 PROCEDURE — 71046 X-RAY EXAM CHEST 2 VIEWS: CPT

## 2018-03-21 PROCEDURE — 85027 COMPLETE CBC AUTOMATED: CPT | Performed by: INTERNAL MEDICINE

## 2018-03-21 PROCEDURE — 84550 ASSAY OF BLOOD/URIC ACID: CPT | Performed by: INTERNAL MEDICINE

## 2018-03-21 PROCEDURE — 20610 DRAIN/INJ JOINT/BURSA W/O US: CPT | Mod: LT | Performed by: INTERNAL MEDICINE

## 2018-03-21 PROCEDURE — 89060 EXAM SYNOVIAL FLUID CRYSTALS: CPT | Performed by: INTERNAL MEDICINE

## 2018-03-21 PROCEDURE — 72170 X-RAY EXAM OF PELVIS: CPT

## 2018-03-21 NOTE — NURSING NOTE
"Chief Complaint   Patient presents with     Hospital F/U       Initial /76 (BP Location: Left arm, Cuff Size: Adult Large)  Pulse 92  Temp 97.4  F (36.3  C) (Oral)  Ht 5' 8\" (1.727 m)  Wt 150 lb (68 kg)  BMI 22.81 kg/m2 Estimated body mass index is 22.81 kg/(m^2) as calculated from the following:    Height as of this encounter: 5' 8\" (1.727 m).    Weight as of this encounter: 150 lb (68 kg).  Medication Reconciliation: complete     Gilma Leavitt MA    "

## 2018-03-21 NOTE — LETTER
[facility]  [address] [facility]  [address]  [place]  [address]  Tel: [phone]    March 26, 2018    Spenser Biggs  [address] [place] [id number]        Dear Spenser Meyers,  Enclosed are your laboratory reports from your recent office exam.    I analyzed the fluid from your knee joint which showed that your acute problem was related to gout. Hopefully the cortisone injection relieve the pain and irritation from the uric acid crystals and if your knee is not continuing to feel well I would like to know about.    Basic metabolic panel showed that your minerals and electrolytes look normal and your kidney function tests were normal. As this was not a fasting specimen the glucose is not abnormal.    Your serum uric acid was normal.    I anticipate seeing you back in the office in approximately one month to reevaluate your ongoing status. You are having any problems in the meantime please let me know otherwise, I will look forward to seeing you back in one month.    Thanks,    If you have any further questions or problems, please contact our office.      Sincerely,    Jose Arrieta MD/ Suzie Huitron CMA  Results for orders placed or performed in visit on 03/21/18   Basic metabolic panel   Result Value Ref Range    Sodium 140 133 - 144 mmol/L    Potassium 4.0 3.4 - 5.3 mmol/L    Chloride 105 94 - 109 mmol/L    Carbon Dioxide 27 20 - 32 mmol/L    Anion Gap 8 3 - 14 mmol/L    Glucose 121 (H) 70 - 99 mg/dL    Urea Nitrogen 20 7 - 30 mg/dL    Creatinine 0.79 0.66 - 1.25 mg/dL    GFR Estimate >90 >60 mL/min/1.7m2    GFR Estimate If Black >90 >60 mL/min/1.7m2    Calcium 9.7 8.5 - 10.1 mg/dL   CBC with platelets   Result Value Ref Range    WBC 9.9 4.0 - 11.0 10e9/L    RBC Count 4.03 (L) 4.4 - 5.9 10e12/L    Hemoglobin 12.3 (L) 13.3 - 17.7 g/dL    Hematocrit 38.5 (L) 40.0 - 53.0 %    MCV 96 78 - 100 fl    MCH 30.5 26.5 - 33.0 pg    MCHC 31.9 31.5 - 36.5 g/dL    RDW 13.1 10.0 - 15.0 %     Platelet Count 419 150 - 450 10e9/L   Uric acid   Result Value Ref Range    Uric Acid 4.4 3.5 - 7.2 mg/dL   Crystal ID synovial fluid   Result Value Ref Range    Crystal Analysis (A) NOCRYS^No clincally significant crystals seen.     Positive for intracellular crystals, consistent with monosodium urate crystals.               Enclosure: Lab Results

## 2018-03-21 NOTE — MR AVS SNAPSHOT
After Visit Summary   3/21/2018    Spenser Biggs    MRN: [id number]           Patient Information     Date Of Birth          1940        Visit Information        Provider Department      3/21/2018 11:00 AM Jose Arrieta MD [facility]        Today's Diagnoses     Multiple closed fractures of pelvis without disruption of pelvic ring, initial encounter (H)    -  1    Benign essential hypertension; goal < 140/90        Hyperlipidemia, unspecified hyperlipidemia type        Drug-induced gout of foot, unspecified chronicity, unspecified laterality        Elevated prostate specific antigen (PSA)        Prostatism        Gastroesophageal reflux disease with esophagitis        Urinary retention        Pneumonia of right lower lobe due to infectious organism (H)        Acute deep vein thrombosis (DVT) of left lower extremity, unspecified vein (H)        Acute gouty arthritis           Follow-ups after your visit        Who to contact     If you have questions or need follow up information about today's clinic visit or your schedule please contact [facility] directly at [phone].  Normal or non-critical lab and imaging results will be communicated to you by RiverWired[employer], letter or phone within 4 business days after the clinic has received the results. If you do not hear from us within 7 days, please contact the clinic through [employer] or phone. If you have a critical or abnormal lab result, we will notify you by phone as soon as possible.  Submit refill requests through [employer] or call your pharmacy and they will forward the refill request to us. Please allow 3 business days for your refill to be completed.          Additional Information About Your Visit        MyChart Information     [employer] gives you secure access to your electronic health record. If you see a primary care provider, you can also send messages to your care team and make appointments. If you have questions, please  "call your primary care clinic.  If you do not have a primary care provider, please call [phone] and they will assist you.        Care EveryWhere ID     This is your Care EveryWhere ID. This could be used by other organizations to access your [place] medical records  [id number]        Your Vitals Were     Pulse Temperature Height BMI (Body Mass Index)          92 97.4  F (36.3  C) (Oral) 5' 8\" (1.727 m) 22.81 kg/m2         Blood Pressure from Last 3 Encounters:   03/21/18 114/76   02/04/18 (!) 176/106   12/14/17 (!) 139/91    Weight from Last 3 Encounters:   03/21/18 150 lb (68 kg)   02/04/18 175 lb (79.4 kg)   12/14/17 177 lb (80.3 kg)              We Performed the Following     Basic metabolic panel     CBC with platelets     Crystal ID synovial fluid     DRAIN/INJECT LARGE JOINT/BURSA     METHYLPREDNISOLONE 80 MG INJ     Uric acid        Primary Care Provider Office Phone # Fax #    Jose Arrieta [phone] [address]  [facility] [address]        Equal Access to Services     [facility] : Hadii aad ku hadasho Soomaali, waaxda luqadaha, qaybta kaalmada adeegyada, dave boyer ah. So [place] [phone].    ATENCIÓN: Si habla español, tiene a delacruz disposición servicios gratuitos de asistencia lingüística. Llame al [phone].    We comply with applicable federal civil rights laws and [place] laws. We do not discriminate on the basis of race, color, national origin, age, disability, sex, sexual orientation, or gender identity.            Thank you!     Thank you for choosing [facility]  for your care. Our goal is always to provide you with excellent care. Hearing back from our patients is one way we can continue to improve our services. Please take a few minutes to complete the written survey that you may receive in the mail after your visit with us. Thank you!             Your Updated Medication List - Protect others around you: " Learn how to safely use, store and throw away your medicines at www.disposemymeds.org.          This list is accurate as of 3/21/18 11:59 PM.  Always use your most recent med list.                   Brand Name Dispense Instructions for use Diagnosis    amLODIPine 5 MG tablet    NORVASC    90 tablet    Take 1 tablet (5 mg) by mouth daily    Benign essential hypertension       ASPIRIN PO      Take 81 mg by mouth daily        BENADRYL PO      Take by mouth nightly as needed        COENZYME Q-10 PO      Take 200 mg by mouth daily        docusate sodium 100 MG tablet    COLACE    30 tablet    Take 100 mg by mouth daily        EYE VITAMINS PO      Take 1 tablet by mouth daily        HYDROcodone-acetaminophen 5-325 MG per tablet    NORCO    15 tablet    Take 1 tablet by mouth every 4 hours as needed for pain        lisinopril 40 MG tablet    PRINIVIL/ZESTRIL    90 tablet    Take 1 tablet (40 mg) by mouth daily    Benign essential hypertension       probenecid 500 MG tablet    BENEMID    90 tablet    Take 1 tablet (500 mg) by mouth daily    Drug-induced gout of foot, unspecified chronicity, unspecified laterality       simvastatin 20 MG tablet    ZOCOR    90 tablet    Take 1 tablet (20 mg) by mouth At Bedtime    Hyperlipidemia, unspecified hyperlipidemia type       VITAMIN B 12 PO      Take 1,000 mcg by mouth daily

## 2018-03-21 NOTE — PROGRESS NOTES
SUBJECTIVE:   Spenser Biggs is a 78 year old male who presents to clinic today for the following health issues:    ED/UC Followup:    Facility:   ED, [facility]  Date of visit: 02/04/18, 2/13/18, 02/20/18  Reason for visit: fall, groin pain   ED - The patient presented to the ER with pelvic pain, left greater than right, that began after falling two weeks prior. A pelvic x-ray showed left inferior and superior pubic rami fractures. He was discharged with a prescription for Norco and Colace.     [facility]. Obtained history from patient but unable to obtain complete history due to the patient's altered mental status during part of his hospitalization.   The patient reports that he went to stay with his sister in [place] for recovery after being discharged from the ER. While staying with his sister, he was found to be incoherent and is unable to recall going to the hospital. In the ER, he was diagnosed with right lower lobe pneumonia, encephalopathy, and kidney failure. He was admitted to the ICU. His kidney function then returned to normal and he was moved to a med/surg floor for two weeks. He is still using catheters to prevent urinary retention.  While in the hospital the patient was found to have blood clots in his left lower leg via ultrasound. He was started on Xarelto for management of the blood clots. He was originally started on 15 mg but the dose was increased to 20 mg on 3/14/2018.  While in the hospital he had an EGD done due to having difficulty swallowing. The EGD showed inflammation of the esophagus and benign polyps in the stomach. He was instructed to begin taking 40 mg of omeprazole BID and to followup for a repeat EGD in 2 months for revaluation of the severe esophagitis and to take biopsies for Garcia's. He needs a referral to gastroenterology in the [facility].  He reports being placed on metoprolol  due to having an elevated heart rate while in the hospital.   [facility]. He was discharged from rehab one week ago and is doing at home PT. He has lost 25 pounds over the course of the past 1.5 months and he was placed on a high caloric diet while in the rehab facility.      Current Status: The patient states that his hip pain has significantly improved. He is currently using a walker due to feeling unsteady. He has also developed left knee pain which has made walking difficult. He is hoping to get a referral to orthopedics in the [facility] rather than in [place]. He does have a history of gout.  The patient states that he has been managing his healthcare well by himself at home. He prepares his own meals and is not always eating three meals a day. He is trying to follow a high caloric diet at home.        Problem list and histories reviewed & adjusted, as indicated.  Additional history: as documented      Current Outpatient Prescriptions   Medication Sig Dispense Refill     HYDROcodone-acetaminophen (NORCO) 5-325 MG per tablet Take 1 tablet by mouth every 4 hours as needed for pain 15 tablet 0     docusate sodium (COLACE) 100 MG tablet Take 100 mg by mouth daily 30 tablet 0     amLODIPine (NORVASC) 5 MG tablet Take 1 tablet (5 mg) by mouth daily 90 tablet 3     probenecid (BENEMID) 500 MG tablet Take 1 tablet (500 mg) by mouth daily 90 tablet 3     lisinopril (PRINIVIL/ZESTRIL) 40 MG tablet Take 1 tablet (40 mg) by mouth daily 90 tablet 3     simvastatin (ZOCOR) 20 MG tablet Take 1 tablet (20 mg) by mouth At Bedtime 90 tablet 3     DiphenhydrAMINE HCl (BENADRYL PO) Take by mouth nightly as needed       COENZYME Q-10 PO Take 200 mg by mouth daily       Multiple Vitamins-Minerals (EYE VITAMINS PO) Take 1 tablet by mouth daily       Cyanocobalamin (VITAMIN B 12 PO) Take 1,000 mcg by mouth daily        ASPIRIN PO Take 81 mg by mouth daily       BP Readings from Last 3  "Encounters:   03/21/18 114/76   02/04/18 (!) 176/106   12/14/17 (!) 139/91    Wt Readings from Last 3 Encounters:   03/21/18 68 kg (150 lb)   02/04/18 79.4 kg (175 lb)   12/14/17 80.3 kg (177 lb)            Labs reviewed in EPIC    Reviewed and updated as needed this visit by clinical staff  Tobacco  Allergies  Meds  Problems  Med Hx  Surg Hx  Fam Hx  Soc Hx        Reviewed and updated as needed this visit by Provider         ROS:  Constitutional, HEENT, cardiovascular, pulmonary, GI, , musculoskeletal, neuro, skin, endocrine and psych systems are negative, except as otherwise noted.    This document serves as a record of the services and decisions personally performed and made by Jose Arrieta MD. It was created on his behalf by Dia Acosta, a trained medical scribe. The creation of this document is based the provider's statements to the medical scribe. 3/21/2018  OBJECTIVE:   /76 (BP Location: Left arm, Cuff Size: Adult Large)  Pulse 92  Temp 97.4  F (36.3  C) (Oral)  Ht 1.727 m (5' 8\")  Wt 68 kg (150 lb)  BMI 22.81 kg/m2  Body mass index is 22.81 kg/(m^2).  Neck was supple without adenopathy or thyromegaly his carotids were normal without bruits  Chest clear to auscultation and percussion with decreased breath sounds   Cardiovascular S1 and S2 are physiologic without murmurs or gallops  Abdomen bowel sounds were normal.  There is no palpable mass or organomegaly  Extremities nontender without any edema, no palpable cords in left lower leg  Left Knee: moderate effusion with tenderness along the medial joint line   Left hip flexion was reduced to 80 degrees, external rotation was normal without tenderness  Pulses pedal pulses are as described otherwise his pulses are bilaterally symmetrical throughout without bruits  Skin without significant abnormality    Procedure:  The left knee was cleaned and prepped and using a 21 gauge needle, the effusion was removed without difficulty. Total 40 " cc of serosanguinous fluid was removed. A sample was collected and sent to lab for evaluation. Using sterile technique and a 21 gauge needle, 60 mg of medrol with lidocaine and marcaine were injected without difficulty.     Diagnostic Test Results:  Pending    Left Knee X-Ray: mild effusion noted, no significant osteoarthritis in the medial compartment     Chest X-Ray: right lower lobe haziness, will obtain x-ray from [facility] for comparison    Pelvic X-Ray: waiting for radiology review for comparison to original x-ray completed on 2/4/2018  ASSESSMENT/PLAN:   1. Multiple closed fractures of pelvis without disruption of pelvic ring, initial encounter (H)  Improving. Recommended that the patient continue with PT to work on regaining strength. Also, continue using walker for stability until strength is completely regained.   - XR Chest 2 Views; Future  - XR Pelvis 1/2 Views; Future  - XR Knee Left 1/2 Views; Future    2. Benign essential hypertension; goal < 140/90  Controlled with amlodipine, and lisinopril. Lab results pending.   - Basic metabolic panel  - CBC with platelets    3. Hyperlipidemia, unspecified hyperlipidemia type  Controlled with simvastatin.     4. Drug-induced gout of foot, unspecified chronicity, unspecified laterality  Effusion and pain of left knee is possibly related to gout attack. Joint aspiration completed in the clinic and lab results pending.   - Uric acid  - Crystal ID synovial fluid    5. Elevated prostate specific antigen (PSA)    6. Prostatism    7. Gastroesophageal reflux disease with esophagitis  Continue with omeprazole BID. Followup in one month for repeat EGD. Referral to gastroenterology provided.   - CBC with platelets    8. Urinary retention  Patient referred to urology. Continue with catheter use. Continue to monitor kidney function levels.     9. Pneumonia of right lower lobe due to infectious organism (H)  Will obtain records of initial chest x-ray for comparison.    10.  Acute deep vein thrombosis (DVT) of left lower extremity, unspecified vein (H)  Improved. Patient is currently anticoagulated on Xarelto.  55 minutes  Jose Arrieta MD  [facility]    The information in this document, created by the medical scribe for me, accurately reflects the services I personally performed and the decisions made by me. I have reviewed and approved this document for accuracy prior to leaving the patient care area.  Jose Arrieta MD  11:34 AM, 03/21/18

## 2018-03-22 LAB
ANION GAP SERPL CALCULATED.3IONS-SCNC: 8 MMOL/L (ref 3–14)
BUN SERPL-MCNC: 20 MG/DL (ref 7–30)
CALCIUM SERPL-MCNC: 9.7 MG/DL (ref 8.5–10.1)
CHLORIDE SERPL-SCNC: 105 MMOL/L (ref 94–109)
CO2 SERPL-SCNC: 27 MMOL/L (ref 20–32)
CREAT SERPL-MCNC: 0.79 MG/DL (ref 0.66–1.25)
GFR SERPL CREATININE-BSD FRML MDRD: >90 ML/MIN/1.7M2
GLUCOSE SERPL-MCNC: 121 MG/DL (ref 70–99)
POTASSIUM SERPL-SCNC: 4 MMOL/L (ref 3.4–5.3)
SODIUM SERPL-SCNC: 140 MMOL/L (ref 133–144)
URATE SERPL-MCNC: 4.4 MG/DL (ref 3.5–7.2)

## 2018-04-02 ENCOUNTER — MYC MEDICAL ADVICE (OUTPATIENT)
Dept: FAMILY MEDICINE | Facility: CLINIC | Age: 78
End: 2018-04-02

## 2018-04-06 ENCOUNTER — MYC MEDICAL ADVICE (OUTPATIENT)
Dept: FAMILY MEDICINE | Facility: CLINIC | Age: 78
End: 2018-04-06

## 2018-04-06 DIAGNOSIS — K21.00 GASTROESOPHAGEAL REFLUX DISEASE WITH ESOPHAGITIS: Primary | ICD-10-CM

## 2018-04-06 NOTE — TELEPHONE ENCOUNTER
Patient insurance does not require referrals. Patient just needs and order from the doctor.I cannot sign orders.    Jennifer Mckeon  Referral Coordinator

## 2018-04-06 NOTE — TELEPHONE ENCOUNTER
Routing to PCP to review, and pt is ok to wait for PCP - unless DOD comfortable advising,  Pt needs referral for EGD based on notes from last OV, Pended GI referral for review.  Notes from OV state pt started on Plavix, but this is not on pt's record.  Called and verified medication with pt. Added to chart as historical (pended- cannot sign without signing EGD also pended).   Last EGD was with [facility] on 2/13/18, but requesting F/U at .  Pended for review.    Pt denies any current sx regarding swallowing/heartburn, esophagitis.  As pt is on dual ACC (Xarelto, ASA) for recent blood clots in LLE, and no current upper GI sx, would it be appropriate to have pt wait another 1-2 months for follow up EGD, as it will most likely need ACC hold?    Keyla Hutson RN

## 2018-04-09 NOTE — TELEPHONE ENCOUNTER
"Jennifer Mckeon:    The EGD order has been signed.   What number should patient be given to call to schedule EGD at [facility]?  Looks like the order is as an \"external referral\".    Thank you!  Laina Morales RN      "

## 2018-04-09 NOTE — TELEPHONE ENCOUNTER
The  will call patient to schedule. They usually will call within 48 hrs. Since he got his referral on Friday he should get a call either today or tomorrow. The number is not on the referral for the reason they will call patient to schedule.    Jennifer Mckeon  Referral Coordinator

## 2018-04-09 NOTE — TELEPHONE ENCOUNTER
"Jennifer:    Daisy Ball will still be notified to call patient even when the referral was placed as an \"external referral\"?    Laina Morales RN    "

## 2018-04-09 NOTE — TELEPHONE ENCOUNTER
Looks like it was made to  services. Not sure why it's marked as eternal. Maybe ask Dr Arrieta where he was referring patient.    Jennifer Mckeon  Referral Coordinator

## 2018-04-21 ASSESSMENT — ENCOUNTER SYMPTOMS
HYPERTENSION: 1
DYSURIA: 1

## 2018-04-22 ENCOUNTER — MYC MEDICAL ADVICE (OUTPATIENT)
Dept: FAMILY MEDICINE | Facility: CLINIC | Age: 78
End: 2018-04-22

## 2018-04-23 ENCOUNTER — OFFICE VISIT (OUTPATIENT)
Dept: UROLOGY | Facility: CLINIC | Age: 78
End: 2018-04-23
Payer: COMMERCIAL

## 2018-04-23 VITALS
WEIGHT: 150 LBS | OXYGEN SATURATION: 96 % | HEIGHT: 68 IN | BODY MASS INDEX: 22.73 KG/M2 | SYSTOLIC BLOOD PRESSURE: 110 MMHG | DIASTOLIC BLOOD PRESSURE: 64 MMHG | HEART RATE: 93 BPM

## 2018-04-23 DIAGNOSIS — N40.1 BENIGN PROSTATIC HYPERPLASIA WITH URINARY RETENTION: Primary | ICD-10-CM

## 2018-04-23 DIAGNOSIS — R33.8 BENIGN PROSTATIC HYPERPLASIA WITH URINARY RETENTION: Primary | ICD-10-CM

## 2018-04-23 LAB — RESIDUAL VOLUME (RV) (EXTERNAL): 46

## 2018-04-23 PROCEDURE — 51700 IRRIGATION OF BLADDER: CPT | Performed by: UROLOGY

## 2018-04-23 PROCEDURE — 99203 OFFICE O/P NEW LOW 30 MIN: CPT | Mod: 25 | Performed by: UROLOGY

## 2018-04-23 RX ORDER — TAMSULOSIN HYDROCHLORIDE 0.4 MG/1
0.4 CAPSULE ORAL DAILY
Qty: 30 CAPSULE | Refills: 11 | Status: SHIPPED | OUTPATIENT
Start: 2018-04-23 | End: 2018-06-04

## 2018-04-23 RX ORDER — RIVAROXABAN 15 MG/1
TABLET, FILM COATED ORAL
Refills: 0 | COMMUNITY
Start: 2018-03-08 | End: 2018-05-08

## 2018-04-23 RX ORDER — METOPROLOL TARTRATE 50 MG
TABLET ORAL
Refills: 0 | COMMUNITY
Start: 2018-03-09 | End: 2018-05-08

## 2018-04-23 ASSESSMENT — PAIN SCALES - GENERAL: PAINLEVEL: NO PAIN (0)

## 2018-04-23 NOTE — LETTER
4/23/2018       RE: Spenser Biggs  [address]   [facility] [address]     Dear Colleague,    Thank you for referring your patient, Spenser Biggs, to the [facility] [place] at [facility]. Please see a copy of my visit note below.          Chief Complaint:   Urinary Retention         Consult or Referral:     Mr. Spenser Biggs is a 78 year old male seen in consultation from Dr. Arrieta.         History of Present Illness:    Spenser Biggs is a very pleasant 78 year old male who presents with a history of urinary retention. Patient has a history of a recent fall and was treated in hospital in [place] in January 2018 secondary to pelvic fracture. At that time, was found to be in urinary retention. Failed TOV both in hospital and at rehab. Currently has Portillo in place and was last changed March 6. Has continued to recover from his fall rather well.    Patient reports no issues with voiding previously. But does have history of BPH. Previous prostate biopsy 4 years ago, were negative - PSA around 7 at that time. No hematuria or dysuria.         Past Medical History:     Past Medical History:   Diagnosis Date     Blood in semen      Gout      History of thrombophlebitis      Hyperlipidemia      Hypertension    Pelvis fracture         Past Surgical History:     Past Surgical History:   Procedure Laterality Date     APPENDECTOMY      at age 7     ENT SURGERY      tonsilectomy at age 5     VASECTOMY              Medications     Current Outpatient Prescriptions   Medication     amLODIPine (NORVASC) 5 MG tablet     ASPIRIN PO     COENZYME Q-10 PO     Cyanocobalamin (VITAMIN B 12 PO)     DiphenhydrAMINE HCl (BENADRYL PO)     docusate sodium (COLACE) 100 MG tablet     HYDROcodone-acetaminophen (NORCO) 5-325 MG per tablet     lisinopril (PRINIVIL/ZESTRIL) 40 MG tablet     metoprolol tartrate (LOPRESSOR) 50 MG tablet     Multiple Vitamins-Minerals  "(EYE VITAMINS PO)     probenecid (BENEMID) 500 MG tablet     rivaroxaban ANTICOAGULANT (XARELTO) 20 MG TABS tablet     simvastatin (ZOCOR) 20 MG tablet     XARELTO 15 MG TABS tablet     No current facility-administered medications for this visit.           Family History:     Family History   Problem Relation Age of Onset     CEREBROVASCULAR DISEASE Mother      DIABETES Father      Coronary Artery Disease Father      Obesity Father      Uterine Cancer Maternal Grandmother      OSTEOPOROSIS Sister      Genetic Disorder Daughter    No known  history         Social History:     Social History     Social History     Marital status:      Spouse name: N/A     Number of children: N/A     Years of education: N/A     Occupational History     Not on file.     Social History Main Topics     Smoking status: Former Smoker     Smokeless tobacco: Never Used      Comment: 2 cigars per year occasionally     Alcohol use 0.0 oz/week     0 Standard drinks or equivalent per week      Comment: a beer at night     Drug use: No     Sexual activity: No     Other Topics Concern     Not on file     Social History Narrative   Works as contract .         Allergies:   Seasonal allergies         Review of Systems:  From intake questionnaire     Negative 14 system review except as noted on HPI, nurse's note.         Physical Exam:     Patient is a 78 year old  male   Vitals: Blood pressure 110/64, pulse 93, height 1.727 m (5' 8\"), weight 68 kg (150 lb), SpO2 96 %.  General Appearance Adult: Body mass index is 22.81 kg/(m^2).  Alert, no acute distress, oriented  HENT: throat/mouth:normal, good dentition  Lungs: no respiratory distress, or pursed lip breathing  Heart: No obvious jugular venous distension present  Abdomen: soft, nontender, no organomegaly or masses,   Lymphatics: No inguinal adenopathy  Musculoskeltal: extremities normal, no peripheral edema  Skin: no suspicious lesions or rashes  Neuro: Alert, oriented, speech " and mentation normal  Psych: affect and mood normal  Gait: Normal  : penis, scrotum, testes normal. Portillo in place with clear urine.  CHRIS anodular, symmetric      Labs and Pathology:    I reviewed all applicable laboratory and pathology data and went over findings with patient  Significant for   Lab Results   Component Value Date    CR 0.79 03/21/2018    CR 0.87 11/21/2017    CR 1.05 04/03/2017    CR 0.87 10/17/2016     PSA   Date Value Ref Range Status   11/21/2017 9.50 (H) 0 - 4 ug/L Final     Comment:     Assay Method:  Chemiluminescence using Siemens Vista analyzer   03/30/2017 8.82 (H) 0 - 4 ug/L Final     Comment:     Assay Method:  Chemiluminescence using Siemens Vista analyzer   10/17/2016 9.70 (H) 0 - 4 ug/L Final     Comment:     Assay Method:  Chemiluminescence using Siemens Vista analyzer         Outside and Past Medical records:    I spent 10 minutes reviewing outside and past medical records.         Assessment and Plan:     Assessment: 78 year old male with urinary retention. Passed trial of void today and able to empty rather well with PVR 46 ml. I advised he continue on tamsulosin to aid in voiding. Given his ability to void today, will leave catheter out and have him follow-up in about 4-6 weeks for a PVR and symptom review. He was advised to call or return sooner if he has difficulty voiding and would reconsider Portillo vs [facility]IC teaching should this occur.    Plan:  Passed TOV  Tamsulosin  4-6 weeks with PVR and symptom review      Orders  Orders Placed This Encounter   Procedures     Bladder scan       Again, thank you for allowing me to participate in the care of your patient.      Sincerely,    Bhavin Etienne MD

## 2018-04-23 NOTE — PROGRESS NOTES
Chief Complaint:   Urinary Retention         Consult or Referral:     Mr. Spenser Biggs is a 78 year old male seen in consultation from Dr. Arrieta.         History of Present Illness:    Spenser Biggs is a very pleasant 78 year old male who presents with a history of urinary retention. Patient has a history of a recent fall and was treated in hospital in [place] in January 2018 secondary to pelvic fracture. At that time, was found to be in urinary retention. Failed TOV both in hospital and at rehab. Currently has Portillo in place and was last changed March 6. Has continued to recover from his fall rather well.    Patient reports no issues with voiding previously. But does have history of BPH. Previous prostate biopsy 4 years ago, were negative - PSA around 7 at that time. No hematuria or dysuria.         Past Medical History:     Past Medical History:   Diagnosis Date     Blood in semen      Gout      History of thrombophlebitis      Hyperlipidemia      Hypertension    Pelvis fracture         Past Surgical History:     Past Surgical History:   Procedure Laterality Date     APPENDECTOMY      at age 7     ENT SURGERY      tonsilectomy at age 5     VASECTOMY              Medications     Current Outpatient Prescriptions   Medication     amLODIPine (NORVASC) 5 MG tablet     ASPIRIN PO     COENZYME Q-10 PO     Cyanocobalamin (VITAMIN B 12 PO)     DiphenhydrAMINE HCl (BENADRYL PO)     docusate sodium (COLACE) 100 MG tablet     HYDROcodone-acetaminophen (NORCO) 5-325 MG per tablet     lisinopril (PRINIVIL/ZESTRIL) 40 MG tablet     metoprolol tartrate (LOPRESSOR) 50 MG tablet     Multiple Vitamins-Minerals (EYE VITAMINS PO)     probenecid (BENEMID) 500 MG tablet     rivaroxaban ANTICOAGULANT (XARELTO) 20 MG TABS tablet     simvastatin (ZOCOR) 20 MG tablet     XARELTO 15 MG TABS tablet     No current facility-administered medications for this visit.           Family History:     Family History   Problem Relation Age of  "Onset     CEREBROVASCULAR DISEASE Mother      DIABETES Father      Coronary Artery Disease Father      Obesity Father      Uterine Cancer Maternal Grandmother      OSTEOPOROSIS Sister      Genetic Disorder Daughter    No known  history         Social History:     Social History     Social History     Marital status:      Spouse name: N/A     Number of children: N/A     Years of education: N/A     Occupational History     Not on file.     Social History Main Topics     Smoking status: Former Smoker     Smokeless tobacco: Never Used      Comment: 2 cigars per year occasionally     Alcohol use 0.0 oz/week     0 Standard drinks or equivalent per week      Comment: a beer at night     Drug use: No     Sexual activity: No     Other Topics Concern     Not on file     Social History Narrative   Works as [employer] .         Allergies:   Seasonal allergies         Review of Systems:  From intake questionnaire     Negative 14 system review except as noted on HPI, nurse's note.         Physical Exam:     Patient is a 78 year old  male   Vitals: Blood pressure 110/64, pulse 93, height 1.727 m (5' 8\"), weight 68 kg (150 lb), SpO2 96 %.  General Appearance Adult: Body mass index is 22.81 kg/(m^2).  Alert, no acute distress, oriented  HENT: throat/mouth:normal, good dentition  Lungs: no respiratory distress, or pursed lip breathing  Heart: No obvious jugular venous distension present  Abdomen: soft, nontender, no organomegaly or masses,   Lymphatics: No inguinal adenopathy  Musculoskeltal: extremities normal, no peripheral edema  Skin: no suspicious lesions or rashes  Neuro: Alert, oriented, speech and mentation normal  Psych: affect and mood normal  Gait: Normal  : penis, scrotum, testes normal. Portillo in place with clear urine.  CHRIS anodular, symmetric      Labs and Pathology:    I reviewed all applicable laboratory and pathology data and went over findings with patient  Significant for   Lab Results "   Component Value Date    CR 0.79 03/21/2018    CR 0.87 11/21/2017    CR 1.05 04/03/2017    CR 0.87 10/17/2016     PSA   Date Value Ref Range Status   11/21/2017 9.50 (H) 0 - 4 ug/L Final     Comment:     Assay Method:  Chemiluminescence using Siemens Vista analyzer   03/30/2017 8.82 (H) 0 - 4 ug/L Final     Comment:     Assay Method:  Chemiluminescence using Siemens Vista analyzer   10/17/2016 9.70 (H) 0 - 4 ug/L Final     Comment:     Assay Method:  Chemiluminescence using Siemens Vista analyzer         Outside and Past Medical records:    I spent 10 minutes reviewing outside and past medical records.         Assessment and Plan:     Assessment: 78 year old male with urinary retention. Passed trial of void today and able to empty rather well with PVR 46 ml. I advised he continue on tamsulosin to aid in voiding. Given his ability to void today, will leave catheter out and have him follow-up in about 4-6 weeks for a PVR and symptom review. He was advised to call or return sooner if he has difficulty voiding and would reconsider Portillo vs [facility]IC teaching should this occur.    Plan:  Passed TOV  Tamsulosin  4-6 weeks with PVR and symptom review      Orders  Orders Placed This Encounter   Procedures     Bladder scan     Bhavin Etienne MD  Urology  [facility]  Clinic Phone [phone]      Answers for HPI/ROS submitted by the patient on 4/21/2018   General Symptoms: No  Skin Symptoms: No  HENT Symptoms: No  EYE SYMPTOMS: No  HEART SYMPTOMS: Yes  LUNG SYMPTOMS: No  INTESTINAL SYMPTOMS: No  URINARY SYMPTOMS: Yes  REPRODUCTIVE SYMPTOMS: No  SKELETAL SYMPTOMS: No  BLOOD SYMPTOMS: No  NERVOUS SYSTEM SYMPTOMS: No  MENTAL HEALTH SYMPTOMS: No  High blood pressure: Yes  Edema or swelling: Yes  Pain or burning: Yes

## 2018-04-23 NOTE — MR AVS SNAPSHOT
After Visit Summary   4/23/2018    Spenser Biggs    MRN: [id number]           Patient Information     Date Of Birth          1940        Visit Information        Provider Department      4/23/2018 2:15 PM Bhavin Etienne MD [facility] [place]        Today's Diagnoses     Benign prostatic hyperplasia with urinary retention    -  1       Follow-ups after your visit        Follow-up notes from your care team     Return in about 6 weeks (around 6/4/2018).      Your next 10 appointments already scheduled     Apr 30, 2018   Procedure with Art Clay MD   [facility] ([facility] [place])    [address]  [place] [address] [phone]           [facility] is located at [address] [place]            Jun 04, 2018  1:30 PM CDT   Return Visit with Bhavin Etienne MD   [facility] [place])    [address]  [address]  [facility] [id number] [phone]              Who to contact     If you have questions or need follow up information about today's clinic visit or your schedule please contact [facility] directly at [phone].  Normal or non-critical lab and imaging results will be communicated to you by MyChart, letter or phone within 4 business days after the clinic has received the results. If you do not hear from us within 7 days, please contact the clinic through Zapo[employer] or phone. If you have a critical or abnormal lab result, we will notify you by phone as soon as possible.  Submit refill requests through [facility] or call your pharmacy and they will forward the refill request to us. Please allow 3 business days for your refill to be completed.          Additional Information About Your Visit        [email or web address] Information     [facility] gives you secure access to your electronic  "health record. If you see a primary care provider, you can also send messages to your care team and make appointments. If you have questions, please call your primary care clinic.  If you do not have a primary care provider, please call [phone] and they will assist you.        Care EveryWhere ID     This is your Care EveryWhere ID. This could be used by other organizations to access your [facility] medical records  [id number]        Your Vitals Were     Pulse Height Pulse Oximetry BMI (Body Mass Index)          93 1.727 m (5' 8\") 96% 22.81 kg/m2         Blood Pressure from Last 3 Encounters:   04/23/18 110/64   03/21/18 114/76   02/04/18 (!) 176/106    Weight from Last 3 Encounters:   04/23/18 68 kg (150 lb)   03/21/18 68 kg (150 lb)   02/04/18 79.4 kg (175 lb)              We Performed the Following     Bladder scan          Today's Medication Changes          These changes are accurate as of 4/23/18  3:40 PM.  If you have any questions, ask your nurse or doctor.               Start taking these medicines.        Dose/Directions    tamsulosin 0.4 MG capsule   Commonly known as:  FLOMAX   Used for:  Benign prostatic hyperplasia with urinary retention   Started by:  Bhavin Etienne MD        Dose:  0.4 mg   Take 1 capsule (0.4 mg) by mouth daily   Quantity:  30 capsule   Refills:  11            Where to get your medicines      These medications were sent to [id number] - [place], [place] - [address]  [address], [facility] [address]     Phone:  [phone]     tamsulosin 0.4 MG capsule                Primary Care Provider Office Phone # Fax #    Jose Arrieta [phone] [address]  [facility] [address]        Equal Access to Services     ÁLVARO JACQUES : Hadii lincoln craft hadasho Sopadmajaali, waaxda luqadaha, qaybta kaalmada willemyada, dave malin. So [facility] [phone].    ATENCIÓN: Si feli espadriana jaffe a delacruz " disposición servicios gratuitos de asistencia lingüística. Aniceto gutierrez [phone].    We comply with applicable federal civil rights laws and [place] laws. We do not discriminate on the basis of race, color, national origin, age, disability, sex, sexual orientation, or gender identity.            Thank you!     Thank you for choosing [facility] [place]  for your care. Our goal is always to provide you with excellent care. Hearing back from our patients is one way we can continue to improve our services. Please take a few minutes to complete the written survey that you may receive in the mail after your visit with us. Thank you!             Your Updated Medication List - Protect others around you: Learn how to safely use, store and throw away your medicines at www.disposemymeds.org.          This list is accurate as of 4/23/18  3:40 PM.  Always use your most recent med list.                   Brand Name Dispense Instructions for use Diagnosis    amLODIPine 5 MG tablet    NORVASC    90 tablet    Take 1 tablet (5 mg) by mouth daily    Benign essential hypertension       ASPIRIN PO      Take 81 mg by mouth daily        BENADRYL PO      Take by mouth nightly as needed        COENZYME Q-10 PO      Take 200 mg by mouth daily        docusate sodium 100 MG tablet    COLACE    30 tablet    Take 100 mg by mouth daily        EYE VITAMINS PO      Take 1 tablet by mouth daily        HYDROcodone-acetaminophen 5-325 MG per tablet    NORCO    15 tablet    Take 1 tablet by mouth every 4 hours as needed for pain        lisinopril 40 MG tablet    PRINIVIL/ZESTRIL    90 tablet    Take 1 tablet (40 mg) by mouth daily    Benign essential hypertension       metoprolol tartrate 50 MG tablet    LOPRESSOR     TK 1 T PO  BID.        probenecid 500 MG tablet    BENEMID    90 tablet    Take 1 tablet (500 mg) by mouth daily    Drug-induced gout of foot, unspecified chronicity, unspecified laterality       *  XARELTO 15 MG Tabs tablet   Generic drug:  rivaroxaban ANTICOAGULANT      TK 1 T PO  BID. STOP TAKING MARCH 13TH 2018 AFTER 8PM DOSE        * rivaroxaban ANTICOAGULANT 20 MG Tabs tablet    XARELTO     Take 1 tablet (20 mg) by mouth daily (with dinner)        simvastatin 20 MG tablet    ZOCOR    90 tablet    Take 1 tablet (20 mg) by mouth At Bedtime    Hyperlipidemia, unspecified hyperlipidemia type       tamsulosin 0.4 MG capsule    FLOMAX    30 capsule    Take 1 capsule (0.4 mg) by mouth daily    Benign prostatic hyperplasia with urinary retention       VITAMIN B 12 PO      Take 1,000 mcg by mouth daily        * Notice:  This list has 2 medication(s) that are the same as other medications prescribed for you. Read the directions carefully, and ask your doctor or other care provider to review them with you.

## 2018-04-23 NOTE — NURSING NOTE
Chief Complaint   Patient presents with     Benign Prostatic Hypertrophy     Pt here to follow-up on BPD, cath in     Comes into clinic today at the request of No ref. provider found for TOV / catheter removal.    This service provided today was under the direct supervision of Dr. Etienne, who was available if needed.    presented today for a trial of void.  Approximately 375 mL of normal saline instilled into bladder via catheter.  Patient stated he had urge to urinate and catheter was removed without difficulty.  Patient was given a graduated cylinder to measure urine output.  Patient voided approximately 350 mL of clear urine.  PVR 46 mL.    Cipro 500 given per protocol: Yes    Patient did tolerate procedure well.    Teaching done with patient verbally as where to call or go if pain, fever, or unable to urinate post catheter removal.          Celina Elizabeth, CMA

## 2018-04-23 NOTE — TELEPHONE ENCOUNTER
Dr. Arrieta,    Please see "[employer] message below regarding Xarelto hold prior to endoscopy.     Please advise.     Laina Morales RN

## 2018-04-25 NOTE — TELEPHONE ENCOUNTER
Huddled with Dr. Arrieta.   Per Dr. Arrieta, patient is to have a 2 day hold of Xarelto prior to procedure.    I called and relayed this to patient.     Laina Morales RN

## 2018-04-25 NOTE — TELEPHONE ENCOUNTER
Please call pt regarding this medication.  He will be available all day.  His procedure is on Monday and he needs to know what to do with his Xarelto.

## 2018-04-30 ENCOUNTER — HOSPITAL ENCOUNTER (OUTPATIENT)
Facility: CLINIC | Age: 78
Discharge: HOME | End: 2018-04-30
Attending: INTERNAL MEDICINE | Admitting: INTERNAL MEDICINE
Payer: COMMERCIAL

## 2018-04-30 VITALS
OXYGEN SATURATION: 98 % | SYSTOLIC BLOOD PRESSURE: 129 MMHG | RESPIRATION RATE: 15 BRPM | DIASTOLIC BLOOD PRESSURE: 85 MMHG

## 2018-04-30 DIAGNOSIS — K21.00 GASTROESOPHAGEAL REFLUX DISEASE WITH ESOPHAGITIS: Primary | ICD-10-CM

## 2018-04-30 LAB — UPPER GI ENDOSCOPY: NORMAL

## 2018-04-30 PROCEDURE — 99153 MOD SED SAME PHYS/QHP EA: CPT | Performed by: INTERNAL MEDICINE

## 2018-04-30 PROCEDURE — 88305 TISSUE EXAM BY PATHOLOGIST: CPT | Mod: 26 | Performed by: INTERNAL MEDICINE

## 2018-04-30 PROCEDURE — 25000128 H RX IP 250 OP 636: Performed by: INTERNAL MEDICINE

## 2018-04-30 PROCEDURE — 25000125 ZZHC RX 250: Performed by: INTERNAL MEDICINE

## 2018-04-30 PROCEDURE — 88305 TISSUE EXAM BY PATHOLOGIST: CPT | Performed by: INTERNAL MEDICINE

## 2018-04-30 PROCEDURE — 43239 EGD BIOPSY SINGLE/MULTIPLE: CPT | Performed by: INTERNAL MEDICINE

## 2018-04-30 RX ORDER — ONDANSETRON 2 MG/ML
4 INJECTION INTRAMUSCULAR; INTRAVENOUS
Status: CANCELLED | OUTPATIENT
Start: 2018-04-30

## 2018-04-30 RX ORDER — LIDOCAINE 40 MG/G
CREAM TOPICAL
Status: CANCELLED | OUTPATIENT
Start: 2018-04-30

## 2018-04-30 RX ORDER — FENTANYL CITRATE 50 UG/ML
INJECTION, SOLUTION INTRAMUSCULAR; INTRAVENOUS PRN
Status: DISCONTINUED | OUTPATIENT
Start: 2018-04-30 | End: 2018-04-30 | Stop reason: HOSPADM

## 2018-04-30 RX ORDER — PANTOPRAZOLE SODIUM 40 MG/1
40 TABLET, DELAYED RELEASE ORAL DAILY
Qty: 30 TABLET | Refills: 1 | Status: SHIPPED | OUTPATIENT
Start: 2018-04-30 | End: 2019-02-06

## 2018-05-01 LAB — COPATH REPORT: NORMAL

## 2018-05-08 ENCOUNTER — OFFICE VISIT (OUTPATIENT)
Dept: FAMILY MEDICINE | Facility: CLINIC | Age: 78
End: 2018-05-08
Payer: COMMERCIAL

## 2018-05-08 VITALS
HEART RATE: 74 BPM | SYSTOLIC BLOOD PRESSURE: 128 MMHG | TEMPERATURE: 98 F | OXYGEN SATURATION: 98 % | WEIGHT: 150.8 LBS | BODY MASS INDEX: 22.93 KG/M2 | DIASTOLIC BLOOD PRESSURE: 86 MMHG

## 2018-05-08 DIAGNOSIS — K21.9 GASTROESOPHAGEAL REFLUX DISEASE WITHOUT ESOPHAGITIS: ICD-10-CM

## 2018-05-08 DIAGNOSIS — I10 BENIGN ESSENTIAL HYPERTENSION: Primary | Chronic | ICD-10-CM

## 2018-05-08 DIAGNOSIS — R97.20 ELEVATED PROSTATE SPECIFIC ANTIGEN (PSA): Chronic | ICD-10-CM

## 2018-05-08 DIAGNOSIS — E78.5 HYPERLIPIDEMIA, UNSPECIFIED HYPERLIPIDEMIA TYPE: Chronic | ICD-10-CM

## 2018-05-08 DIAGNOSIS — M10.279 DRUG-INDUCED GOUT OF FOOT, UNSPECIFIED CHRONICITY, UNSPECIFIED LATERALITY: Chronic | ICD-10-CM

## 2018-05-08 PROCEDURE — 36415 COLL VENOUS BLD VENIPUNCTURE: CPT | Performed by: INTERNAL MEDICINE

## 2018-05-08 PROCEDURE — 89060 EXAM SYNOVIAL FLUID CRYSTALS: CPT | Performed by: INTERNAL MEDICINE

## 2018-05-08 PROCEDURE — 84550 ASSAY OF BLOOD/URIC ACID: CPT | Performed by: INTERNAL MEDICINE

## 2018-05-08 PROCEDURE — 99213 OFFICE O/P EST LOW 20 MIN: CPT | Mod: 25 | Performed by: INTERNAL MEDICINE

## 2018-05-08 PROCEDURE — 20610 DRAIN/INJ JOINT/BURSA W/O US: CPT | Mod: RT | Performed by: INTERNAL MEDICINE

## 2018-05-08 RX ORDER — METOPROLOL TARTRATE 50 MG
TABLET ORAL
Qty: 180 TABLET | Refills: 3 | Status: SHIPPED | OUTPATIENT
Start: 2018-05-08 | End: 2020-02-12

## 2018-05-08 NOTE — MR AVS SNAPSHOT
After Visit Summary   5/8/2018    Spenser Biggs    MRN: [id number]           Patient Information     Date Of Birth          1940        Visit Information        Provider Department      5/8/2018 6:00 PM Jose Arrieta MD [facility]        Today's Diagnoses     Benign essential hypertension; goal < 140/90    -  1    Drug-induced gout of foot, unspecified chronicity, unspecified laterality        Hyperlipidemia, unspecified hyperlipidemia type        Elevated prostate specific antigen (PSA)        Gastroesophageal reflux disease without esophagitis           Follow-ups after your visit        Your next 10 appointments already scheduled     Jun 04, 2018  1:30 PM CDT   Return Visit with Bhavin Etienne MD   [facility] [place] ([facility])    [address]  [address]  [facility] [address] [phone]              Who to contact     If you have questions or need follow up information about today's clinic visit or your schedule please contact [facility] directly at [phone].  Normal or non-critical lab and imaging results will be communicated to you by Close.iohart, letter or phone within 4 business days after the clinic has received the results. If you do not hear from us within 7 days, please contact the clinic through EBIQUOUSt or phone. If you have a critical or abnormal lab result, we will notify you by phone as soon as possible.  Submit refill requests through SimpleRegistry or call your pharmacy and they will forward the refill request to us. Please allow 3 business days for your refill to be completed.          Additional Information About Your Visit        Close.iohart Information     SimpleRegistry gives you secure access to your electronic health record. If you see a primary care provider, you can also send messages to your care team and make appointments. If you have questions, please call your primary care clinic.  If you  do not have a primary care provider, please call [phone] and they will assist you.        Care EveryWhere ID     This is your Care EveryWhere ID. This could be used by other organizations to access your [facility] medical records  [id number]        Your Vitals Were     Pulse Temperature Pulse Oximetry BMI (Body Mass Index)          74 98  F (36.7  C) (Oral) 98% 22.93 kg/m2         Blood Pressure from Last 3 Encounters:   05/08/18 128/86   04/30/18 129/85   04/23/18 110/64    Weight from Last 3 Encounters:   05/08/18 150 lb 12.8 oz (68.4 kg)   04/23/18 150 lb (68 kg)   03/21/18 150 lb (68 kg)              We Performed the Following     Crystal ID synovial fluid     DRAIN/INJECT LARGE JOINT/BURSA     METHYLPREDNISOLONE 80 MG INJ     Uric acid          Today's Medication Changes          These changes are accurate as of 5/8/18 11:59 PM.  If you have any questions, ask your nurse or doctor.               These medicines have changed or have updated prescriptions.        Dose/Directions    metoprolol tartrate 50 MG tablet   Commonly known as:  LOPRESSOR   This may have changed:  See the new instructions.   Used for:  Benign essential hypertension   Changed by:  Jose Arrieta MD        TK 1 T PO  BID.   Quantity:  180 tablet   Refills:  3       rivaroxaban ANTICOAGULANT 20 MG Tabs tablet   Commonly known as:  XARELTO   This may have changed:  Another medication with the same name was removed. Continue taking this medication, and follow the directions you see here.   Changed by:  Jose Arrieta MD        Dose:  20 mg   Take 1 tablet (20 mg) by mouth daily (with dinner)   Refills:  0         Stop taking these medicines if you haven't already. Please contact your care team if you have questions.     ASPIRIN PO   Stopped by:  Jose Arrieta MD           docusate sodium 100 MG tablet   Commonly known as:  COLACE   Stopped by:  Jsoe Arrieta MD           HYDROcodone-acetaminophen 5-325 MG per tablet   Commonly  known as:  JUNIOR   Stopped by:  Jose Arrieta MD                Where to get your medicines      These medications were sent to [id number] - [place], [place] - [address]  [address] [facility] [address]     Phone:  [phone]     metoprolol tartrate 50 MG tablet                Primary Care Provider Office Phone # Fax #    Jose Arrieta [phone] [address]  [facility] [address]        Equal Access to Services     [facility]: Hadii aad ku hadasho Soomaali, waaxda luqadaha, qaybta kaalmada adeegyada, waxay idiin hayaan adeeg kharaaudrey lachandana . So [facility] [phone].    ATENCIÓN: Si habla español, tiene a delacruz disposición servicios gratuitos de asistencia lingüística. [facility] [phone].    We comply with applicable federal civil rights laws and [place] laws. We do not discriminate on the basis of race, color, national origin, age, disability, sex, sexual orientation, or gender identity.            Thank you!     Thank you for choosing [facility]  for your care. Our goal is always to provide you with excellent care. Hearing back from our patients is one way we can continue to improve our services. Please take a few minutes to complete the written survey that you may receive in the mail after your visit with us. Thank you!             Your Updated Medication List - Protect others around you: Learn how to safely use, store and throw away your medicines at www.disposemymeds.org.          This list is accurate as of 5/8/18 11:59 PM.  Always use your most recent med list.                   Brand Name Dispense Instructions for use Diagnosis    amLODIPine 5 MG tablet    NORVASC    90 tablet    Take 1 tablet (5 mg) by mouth daily    Benign essential hypertension       BENADRYL PO      Take by mouth nightly as needed        COENZYME Q-10 PO      Take 200 mg by mouth daily        EYE VITAMINS PO      Take 1 tablet by mouth daily         lisinopril 40 MG tablet    PRINIVIL/ZESTRIL    90 tablet    Take 1 tablet (40 mg) by mouth daily    Benign essential hypertension       metoprolol tartrate 50 MG tablet    LOPRESSOR    180 tablet    TK 1 T PO  BID.    Benign essential hypertension       pantoprazole 40 MG EC tablet    PROTONIX    30 tablet    Take 1 tablet (40 mg) by mouth daily Take 30-60 minutes before a meal.    Gastroesophageal reflux disease with esophagitis       probenecid 500 MG tablet    BENEMID    90 tablet    Take 1 tablet (500 mg) by mouth daily    Drug-induced gout of foot, unspecified chronicity, unspecified laterality       rivaroxaban ANTICOAGULANT 20 MG Tabs tablet    XARELTO     Take 1 tablet (20 mg) by mouth daily (with dinner)        simvastatin 20 MG tablet    ZOCOR    90 tablet    Take 1 tablet (20 mg) by mouth At Bedtime    Hyperlipidemia, unspecified hyperlipidemia type       tamsulosin 0.4 MG capsule    FLOMAX    30 capsule    Take 1 capsule (0.4 mg) by mouth daily    Benign prostatic hyperplasia with urinary retention       VITAMIN B 12 PO      Take 1,000 mcg by mouth daily

## 2018-05-08 NOTE — PROGRESS NOTES
SUBJECTIVE:   Spenser Biggs is a 78 year old male who presents to clinic today for the following health issues:    Chief Complaint   Patient presents with     Knee Pain     patient now having inflammation and fluid build up in R knee causing pain and difficulty with ambulation; patient had L knee drained at last visit, gout was found     The patient presents to the clinic for evaluation of right knee pain. The pt had a joint aspiration of the left knee done on 3/21/2018 which showed signs of gout. He is now having similar symptoms in the right knee.        Problem list and histories reviewed & adjusted, as indicated.  Additional history: as documented    Current Outpatient Prescriptions   Medication Sig Dispense Refill     amLODIPine (NORVASC) 5 MG tablet Take 1 tablet (5 mg) by mouth daily 90 tablet 3     COENZYME Q-10 PO Take 200 mg by mouth daily       Cyanocobalamin (VITAMIN B 12 PO) Take 1,000 mcg by mouth daily        DiphenhydrAMINE HCl (BENADRYL PO) Take by mouth nightly as needed       lisinopril (PRINIVIL/ZESTRIL) 40 MG tablet Take 1 tablet (40 mg) by mouth daily 90 tablet 3     metoprolol tartrate (LOPRESSOR) 50 MG tablet TK 1 T PO  BID.  0     Multiple Vitamins-Minerals (EYE VITAMINS PO) Take 1 tablet by mouth daily       pantoprazole (PROTONIX) 40 MG EC tablet Take 1 tablet (40 mg) by mouth daily Take 30-60 minutes before a meal. 30 tablet 1     probenecid (BENEMID) 500 MG tablet Take 1 tablet (500 mg) by mouth daily 90 tablet 3     rivaroxaban ANTICOAGULANT (XARELTO) 20 MG TABS tablet Take 1 tablet (20 mg) by mouth daily (with dinner)       simvastatin (ZOCOR) 20 MG tablet Take 1 tablet (20 mg) by mouth At Bedtime 90 tablet 3     tamsulosin (FLOMAX) 0.4 MG capsule Take 1 capsule (0.4 mg) by mouth daily 30 capsule 11     [DISCONTINUED] XARELTO 15 MG TABS tablet TK 1 T PO  BID. STOP TAKING MARCH 13TH 2018 AFTER 8PM DOSE  0     BP Readings from Last 3 Encounters:   05/08/18 128/86   04/30/18 129/85    04/23/18 110/64    Wt Readings from Last 3 Encounters:   05/08/18 68.4 kg (150 lb 12.8 oz)   04/23/18 68 kg (150 lb)   03/21/18 68 kg (150 lb)               Reviewed and updated as needed this visit by clinical staff       Reviewed and updated as needed this visit by Provider         ROS:  Constitutional, HEENT, cardiovascular, pulmonary, GI, , musculoskeletal, neuro, skin, endocrine and psych systems are negative, except as otherwise noted.    This document serves as a record of the services and decisions personally performed and made by Jose Arrieta MD. It was created on his behalf by Dia Acosta, a trained medical scribe. The creation of this document is based the provider's statements to the medical scribe. 5/8/2018  OBJECTIVE:   /86  Pulse 74  Temp 98  F (36.7  C) (Oral)  Wt 68.4 kg (150 lb 12.8 oz)  SpO2 98%  BMI 22.93 kg/m2  Body mass index is 22.93 kg/(m^2).  Neck was supple without adenopathy or thyromegaly his carotids were normal without bruits  Chest clear to auscultation and percussion  Cardiovascular S1 and S2 are physiologic without murmurs or gallops  Abdomen bowel sounds were normal.  There is no palpable mass or organomegaly  Extremities nontender without any edema  Right Knee: prominent effusion, warm  Left Knee: synovial thickening but no significant effusion  Pulses pedal pulses are as described otherwise his pulses are bilaterally symmetrical throughout without bruits  Skin without significant abnormality    Procedure:  The lateral superior aspect of the right knee was cleaned and prepped. Using sterile technique and a 21 gauge 1.5-in needle, 19 cc's of serosengounous turbid fluid was removed and 60 mg of medrol with lidocaine and marcaine were injected without difficulty.     Diagnostic Test Results:  none   ASSESSMENT/PLAN:   1. Benign essential hypertension; goal < 140/90  Controlled. Continue medication.   - metoprolol tartrate (LOPRESSOR) 50 MG tablet; TK 1 T PO   BID.  Dispense: 180 tablet; Refill: 3    2. Drug-induced gout of foot, unspecified chronicity, unspecified laterality  Apply heat to the right knee 20 minutes 2x daily for 3 days. Discussed daily preventative medication.   - Crystal ID synovial fluid  - Uric acid    3. Hyperlipidemia, unspecified hyperlipidemia type  Controlled with simvastatin.     4. Elevated prostate specific antigen (PSA)    5. Gastroesophageal reflux disease without esophagitis  Controlled with pantoprazole.       Followup in 3 weeks    Jose Arrieta MD  [facility]    The information in this document, created by the medical scribe for me, accurately reflects the services I personally performed and the decisions made by me. I have reviewed and approved this document for accuracy prior to leaving the patient care area.  Jose Arrieta MD  6:26 PM, 05/08/18

## 2018-05-09 LAB
CRYSTALS SNV MICRO: ABNORMAL
URATE SERPL-MCNC: 4.8 MG/DL (ref 3.5–7.2)

## 2018-05-11 ENCOUNTER — TELEPHONE (OUTPATIENT)
Dept: FAMILY MEDICINE | Facility: CLINIC | Age: 78
End: 2018-05-11

## 2018-05-11 DIAGNOSIS — M10.279 DRUG-INDUCED GOUT OF FOOT, UNSPECIFIED CHRONICITY, UNSPECIFIED LATERALITY: Primary | Chronic | ICD-10-CM

## 2018-05-11 RX ORDER — ALLOPURINOL 300 MG/1
300 TABLET ORAL DAILY
Qty: 30 TABLET | Refills: 1 | Status: SHIPPED | OUTPATIENT
Start: 2018-05-11 | End: 2018-07-07

## 2018-05-11 NOTE — PROGRESS NOTES
I called the patient and informed of results. The following changes were made to treatment:       Patient voices understanding and will contact me with any further questions.     Jose Arrieta MD

## 2018-05-11 NOTE — TELEPHONE ENCOUNTER
Called, SADAF, stop animated and begin allopurinol 300 mg as directed which is I have a tablet daily. Patient is recommended to return to clinic in one month to reevaluate his uric acid and is gout.

## 2018-05-31 ENCOUNTER — OFFICE VISIT (OUTPATIENT)
Dept: FAMILY MEDICINE | Facility: CLINIC | Age: 78
End: 2018-05-31
Payer: COMMERCIAL

## 2018-05-31 VITALS
OXYGEN SATURATION: 97 % | WEIGHT: 147.7 LBS | HEIGHT: 68 IN | DIASTOLIC BLOOD PRESSURE: 68 MMHG | TEMPERATURE: 97.2 F | HEART RATE: 84 BPM | SYSTOLIC BLOOD PRESSURE: 104 MMHG | BODY MASS INDEX: 22.39 KG/M2

## 2018-05-31 DIAGNOSIS — E78.5 HYPERLIPIDEMIA, UNSPECIFIED HYPERLIPIDEMIA TYPE: Chronic | ICD-10-CM

## 2018-05-31 DIAGNOSIS — M10.279 DRUG-INDUCED GOUT OF FOOT, UNSPECIFIED CHRONICITY, UNSPECIFIED LATERALITY: Primary | Chronic | ICD-10-CM

## 2018-05-31 DIAGNOSIS — R97.20 ELEVATED PROSTATE SPECIFIC ANTIGEN (PSA): Chronic | ICD-10-CM

## 2018-05-31 DIAGNOSIS — I10 BENIGN ESSENTIAL HYPERTENSION: Chronic | ICD-10-CM

## 2018-05-31 PROCEDURE — 82040 ASSAY OF SERUM ALBUMIN: CPT | Performed by: INTERNAL MEDICINE

## 2018-05-31 PROCEDURE — 84550 ASSAY OF BLOOD/URIC ACID: CPT | Performed by: INTERNAL MEDICINE

## 2018-05-31 PROCEDURE — 99214 OFFICE O/P EST MOD 30 MIN: CPT | Performed by: INTERNAL MEDICINE

## 2018-05-31 PROCEDURE — 36415 COLL VENOUS BLD VENIPUNCTURE: CPT | Performed by: INTERNAL MEDICINE

## 2018-05-31 PROCEDURE — 80048 BASIC METABOLIC PNL TOTAL CA: CPT | Performed by: INTERNAL MEDICINE

## 2018-05-31 NOTE — PROGRESS NOTES
SUBJECTIVE:   Spenser Biggs is a 78 year old male who presents to clinic today for the following health issues:    Medication Followup     Taking Medication as prescribed: yes    Side Effects:  None    Medication Helping Symptoms:  yes     Patient presents to the clinic to follow up on gout. He reports that his right leg is weak. States that it is sore and when he stands up he can feel pain from his knee to his groin, however the pain is not severe. He is taking the allopurinol but he is not having problems with the medication. He reports that he did have a fall recently where he landed on his knee, he denies any injury to the right knee, abrasions are noted.      Problem list and histories reviewed & adjusted, as indicated.  Additional history: as documented    Current Outpatient Prescriptions   Medication Sig Dispense Refill     allopurinol (ZYLOPRIM) 300 MG tablet Take 1 tablet (300 mg) by mouth daily 30 tablet 1     amLODIPine (NORVASC) 5 MG tablet Take 1 tablet (5 mg) by mouth daily 90 tablet 3     COENZYME Q-10 PO Take 200 mg by mouth daily       Cyanocobalamin (VITAMIN B 12 PO) Take 1,000 mcg by mouth daily        DiphenhydrAMINE HCl (BENADRYL PO) Take by mouth nightly as needed       lisinopril (PRINIVIL/ZESTRIL) 40 MG tablet Take 1 tablet (40 mg) by mouth daily 90 tablet 3     metoprolol tartrate (LOPRESSOR) 50 MG tablet TK 1 T PO  BID. 180 tablet 3     Multiple Vitamins-Minerals (EYE VITAMINS PO) Take 1 tablet by mouth daily       pantoprazole (PROTONIX) 40 MG EC tablet Take 1 tablet (40 mg) by mouth daily Take 30-60 minutes before a meal. 30 tablet 1     rivaroxaban ANTICOAGULANT (XARELTO) 20 MG TABS tablet Take 1 tablet (20 mg) by mouth daily (with dinner)       simvastatin (ZOCOR) 20 MG tablet Take 1 tablet (20 mg) by mouth At Bedtime 90 tablet 3     tamsulosin (FLOMAX) 0.4 MG capsule Take 1 capsule (0.4 mg) by mouth daily 30 capsule 11     Allergies   Allergen Reactions     Seasonal Allergies       "Hay Fever       Reviewed and updated as needed this visit by clinical staff  Allergies  Meds       Reviewed and updated as needed this visit by Provider         ROS:  Constitutional, HEENT, cardiovascular, pulmonary, gi and gu systems are negative, except as otherwise noted.    This document serves as a record of the services and decisions personally performed and made by Jose Arrieta MD. It was created on his/her behalf by Michael Smith, a trained medical scribe. The creation of this document is based the provider's statements to the medical scribe.  Scribfernando Smith 11:23 AM, May 31, 2018    OBJECTIVE:     /68 (BP Location: Right arm, Cuff Size: Adult Large)  Pulse 84  Temp 97.2  F (36.2  C) (Oral)  Ht 1.727 m (5' 8\")  Wt 67 kg (147 lb 11.2 oz)  SpO2 97%  BMI 22.46 kg/m2  Body mass index is 22.46 kg/(m^2).    Neck was supple without adenopathy or thyromegaly his carotids were normal without bruits  Chest clear to auscultation and percussion  Cardiovascular S1 and S2 are physiologic without murmurs or gallops  Abdomen bowel sounds were normal.  There is no palpable mass or organomegaly  Extremities nontender with 2+ pretibial edema  Right Knee: ligaments were intact, drawer sign was negative, achilles seemed intact  He has some minor healing abrasions without erythema and minor soft tissue swelling of the skin surrounding his knee  Pulses pedal pulses are as described otherwise his pulses are bilaterally symmetrical throughout without bruits  Skin without significant abnormality    Diagnostic Test Results:  No results found for this or any previous visit (from the past 24 hour(s)).    ASSESSMENT/PLAN:     1. Drug-induced gout of foot, unspecified chronicity, unspecified laterality  - Basic metabolic panel  - Uric acid    2. Benign essential hypertension; goal < 140/90  - Basic metabolic panel  - Albumin level    3. Hyperlipidemia, unspecified hyperlipidemia type    4. Elevated prostate " specific antigen (PSA)      Jose Arrieta MD  [facility]    The information in this document, created by the medical scribe for me, accurately reflects the services I personally performed and the decisions made by me. I have reviewed and approved this document for accuracy prior to leaving the patient care area.  Jose Arrieta MD  11:36 AM, 05/31/18

## 2018-05-31 NOTE — MR AVS SNAPSHOT
After Visit Summary   5/31/2018    Spenser Biggs    MRN: [id number]           Patient Information     Date Of Birth          1940        Visit Information        Provider Department      5/31/2018 10:45 AM Jose Arrieta MD [facility]        Today's Diagnoses     Drug-induced gout of foot, unspecified chronicity, unspecified laterality    -  1    Benign essential hypertension; goal < 140/90        Hyperlipidemia, unspecified hyperlipidemia type        Elevated prostate specific antigen (PSA)           Follow-ups after your visit        Your next 10 appointments already scheduled     Jun 04, 2018  1:30 PM CDT   Return Visit with Bhavin Etienne MD   [facility] [place] ([facility] [place] [facility])    [address]  [address]  [facility] [address] [phone]              Who to contact     If you have questions or need follow up information about today's clinic visit or your schedule please contact [facility] directly at [phone].  Normal or non-critical lab and imaging results will be communicated to you by FitBarkhart, letter or phone within 4 business days after the clinic has received the results. If you do not hear from us within 7 days, please contact the clinic through [facility] [place] or phone. If you have a critical or abnormal lab result, we will notify you by phone as soon as possible.  Submit refill requests through NeoSystems or call your pharmacy and they will forward the refill request to us. Please allow 3 business days for your refill to be completed.          Additional Information About Your Visit        MyChart Information     NeoSystems gives you secure access to your electronic health record. If you see a primary care provider, you can also send messages to your care team and make appointments. If you have questions, please call your primary care clinic.  If you do not have a primary care provider, please call  "[phone] and they will assist you.        Care EveryWhere ID     This is your Care EveryWhere ID. This could be used by other organizations to access your [facility] medical records  [id number]        Your Vitals Were     Pulse Temperature Height Pulse Oximetry BMI (Body Mass Index)       84 97.2  F (36.2  C) (Oral) 5' 8\" (1.727 m) 97% 22.46 kg/m2        Blood Pressure from Last 3 Encounters:   05/31/18 104/68   05/08/18 128/86   04/30/18 129/85    Weight from Last 3 Encounters:   05/31/18 147 lb 11.2 oz (67 kg)   05/08/18 150 lb 12.8 oz (68.4 kg)   04/23/18 150 lb (68 kg)              We Performed the Following     Albumin level     Basic metabolic panel     Uric acid          Today's Medication Changes          These changes are accurate as of 5/31/18 11:59 PM.  If you have any questions, ask your nurse or doctor.               Stop taking these medicines if you haven't already. Please contact your care team if you have questions.     probenecid 500 MG tablet   Commonly known as:  KIMBERLY                    Primary Care Provider Office Phone # Fax #    Jose Arrieta [phone] [address] [address]        Equal Access to Services     [facility]: Hadii aad ku hadasho Soomaali, waaxda luqadaha, qaybta kaalmada adeegyada, dave boyer . So [facility] [phone].    ATENCIÓN: Si habla español, tiene a delacruz disposición servicios gratuitos de asistencia lingüística. Llame al [phone].    We comply with applicable federal civil rights laws and [place] laws. We do not discriminate on the basis of race, color, national origin, age, disability, sex, sexual orientation, or gender identity.            Thank you!     Thank you for choosing [facility]  for your care. Our goal is always to provide you with excellent care. Hearing back from our patients is one way we can continue to improve our services. Please take a few minutes to " complete the written survey that you may receive in the mail after your visit with us. Thank you!             Your Updated Medication List - Protect others around you: Learn how to safely use, store and throw away your medicines at www.disposemymeds.org.          This list is accurate as of 5/31/18 11:59 PM.  Always use your most recent med list.                   Brand Name Dispense Instructions for use Diagnosis    allopurinol 300 MG tablet    ZYLOPRIM    30 tablet    Take 1 tablet (300 mg) by mouth daily    Drug-induced gout of foot, unspecified chronicity, unspecified laterality       amLODIPine 5 MG tablet    NORVASC    90 tablet    Take 1 tablet (5 mg) by mouth daily    Benign essential hypertension       BENADRYL PO      Take by mouth nightly as needed        COENZYME Q-10 PO      Take 200 mg by mouth daily        EYE VITAMINS PO      Take 1 tablet by mouth daily        lisinopril 40 MG tablet    PRINIVIL/ZESTRIL    90 tablet    Take 1 tablet (40 mg) by mouth daily    Benign essential hypertension       metoprolol tartrate 50 MG tablet    LOPRESSOR    180 tablet    TK 1 T PO  BID.    Benign essential hypertension       pantoprazole 40 MG EC tablet    PROTONIX    30 tablet    Take 1 tablet (40 mg) by mouth daily Take 30-60 minutes before a meal.    Gastroesophageal reflux disease with esophagitis       rivaroxaban ANTICOAGULANT 20 MG Tabs tablet    XARELTO     Take 1 tablet (20 mg) by mouth daily (with dinner)        simvastatin 20 MG tablet    ZOCOR    90 tablet    Take 1 tablet (20 mg) by mouth At Bedtime    Hyperlipidemia, unspecified hyperlipidemia type       tamsulosin 0.4 MG capsule    FLOMAX    30 capsule    Take 1 capsule (0.4 mg) by mouth daily    Benign prostatic hyperplasia with urinary retention       VITAMIN B 12 PO      Take 1,000 mcg by mouth daily

## 2018-06-01 LAB
ALBUMIN SERPL-MCNC: 3 G/DL (ref 3.4–5)
ANION GAP SERPL CALCULATED.3IONS-SCNC: 8 MMOL/L (ref 3–14)
BUN SERPL-MCNC: 18 MG/DL (ref 7–30)
CALCIUM SERPL-MCNC: 9.2 MG/DL (ref 8.5–10.1)
CHLORIDE SERPL-SCNC: 102 MMOL/L (ref 94–109)
CO2 SERPL-SCNC: 29 MMOL/L (ref 20–32)
CREAT SERPL-MCNC: 0.8 MG/DL (ref 0.66–1.25)
GFR SERPL CREATININE-BSD FRML MDRD: >90 ML/MIN/1.7M2
GLUCOSE SERPL-MCNC: 112 MG/DL (ref 70–99)
POTASSIUM SERPL-SCNC: 3.9 MMOL/L (ref 3.4–5.3)
SODIUM SERPL-SCNC: 139 MMOL/L (ref 133–144)
URATE SERPL-MCNC: 4.4 MG/DL (ref 3.5–7.2)

## 2018-06-04 ENCOUNTER — OFFICE VISIT (OUTPATIENT)
Dept: UROLOGY | Facility: CLINIC | Age: 78
End: 2018-06-04
Payer: COMMERCIAL

## 2018-06-04 VITALS
BODY MASS INDEX: 22.28 KG/M2 | SYSTOLIC BLOOD PRESSURE: 119 MMHG | HEIGHT: 68 IN | DIASTOLIC BLOOD PRESSURE: 68 MMHG | WEIGHT: 147 LBS

## 2018-06-04 DIAGNOSIS — R33.8 BENIGN PROSTATIC HYPERPLASIA WITH URINARY RETENTION: ICD-10-CM

## 2018-06-04 DIAGNOSIS — R97.20 ELEVATED PROSTATE SPECIFIC ANTIGEN (PSA): Primary | ICD-10-CM

## 2018-06-04 DIAGNOSIS — N40.1 BENIGN PROSTATIC HYPERPLASIA WITH URINARY RETENTION: ICD-10-CM

## 2018-06-04 PROCEDURE — 99213 OFFICE O/P EST LOW 20 MIN: CPT | Performed by: UROLOGY

## 2018-06-04 RX ORDER — TAMSULOSIN HYDROCHLORIDE 0.4 MG/1
0.4 CAPSULE ORAL DAILY
Qty: 90 CAPSULE | Refills: 3 | Status: SHIPPED | OUTPATIENT
Start: 2018-06-04 | End: 2018-12-10

## 2018-06-04 ASSESSMENT — PAIN SCALES - GENERAL: PAINLEVEL: NO PAIN (0)

## 2018-06-04 NOTE — PROGRESS NOTES
"  CHIEF COMPLAINT   It was my pleasure to see Spenser Biggs who is a 78 year old male for follow-up of urinary retention.      HPI   Spenser Biggs is a very pleasant 78 year old male who presents with a history of urinary retention. Patient has a history of a fall on the ice and was treated in hospital in [place] in January 2018 secondary to pelvic fracture with subsequent long rehab. At that time, was found to be in urinary retention. Failed TOV both in hospital and at rehab. But ultimately passed TOV at last clinic visit. Has since been voiding well without complaint. No hematuria or dysuria. Taking tamsulosin.     Patient reports no issues with voiding previously. But does have history of BPH. Previous prostate biopsy 4 years ago, were negative - PSA around 7 at that time.     PHYSICAL EXAM  Patient is a 78 year old  male   Vitals: Blood pressure 119/68, height 1.727 m (5' 8\"), weight 66.7 kg (147 lb).  General Appearance Adult: Body mass index is 22.35 kg/(m^2).  Alert, no acute distress, oriented  HENT: throat/mouth:normal, good dentition  Lungs: no respiratory distress, or pursed lip breathing  Heart: No obvious jugular venous distension present  Abdomen: soft, nontender, no organomegaly or masses  Musculoskeltal: extremities normal, no peripheral edema  Skin: no suspicious lesions or rashes  Neuro: Alert, oriented, speech and mentation normal  Psych: affect and mood normal  Gait: Still ambulating with assistance of walker    AUASS 2  PVR = 48 ml    ASSESSMENT and PLAN  78 year old male with history of urinary retention. Voiding well currently and doing well on tamsulosin. We also discussed his rather long history of elevated PSA and previous negative biopsies. We discussed that PSA screening is typically not performed in most patients over age 70, but that given his history of elevation, would not be unreasonable to recheck this again once he recovers further from his pelvic fracture.    - continue " tamsulosin  - Follow-up 6 months for PVR and PSA    I spent over 15 minutes with the patient.  Over half this time was spent on counseling regarding history of urinary retention and elevated PSA.    Bhavin Etienne MD  Urology  [facility]  Clinic Phone [phone]

## 2018-06-04 NOTE — MR AVS SNAPSHOT
After Visit Summary   6/4/2018    Spenser Biggs    MRN: [id number]           Patient Information     Date Of Birth          1940        Visit Information        Provider Department      6/4/2018 1:30 PM Bhavin Etienne MD [facility]        Today's Diagnoses     Benign prostatic hyperplasia with urinary retention           Follow-ups after your visit        Follow-up notes from your care team     Return in about 6 months (around 12/4/2018).      Your next 10 appointments already scheduled     Dec 10, 2018  1:00 PM CST   Return Visit with Bhavin Etienne MD   [facility] ([facility] [place])    [address]  [address]  [facility] [address] [phone]              Who to contact     If you have questions or need follow up information about today's clinic visit or your schedule please contact [facility] directly at [phone].  Normal or non-critical lab and imaging results will be communicated to you by [employer], letter or phone within 4 business days after the clinic has received the results. If you do not hear from us within 7 days, please contact the clinic through [employer] or phone. If you have a critical or abnormal lab result, we will notify you by phone as soon as possible.  Submit refill requests through [employer] or call your pharmacy and they will forward the refill request to us. Please allow 3 business days for your refill to be completed.          Additional Information About Your Visit        MyChart Information     [employer] gives you secure access to your electronic health record. If you see a primary care provider, you can also send messages to your care team and make appointments. If you have questions, please call your primary care clinic.  If you do not have a primary care provider, please call [phone] and they will  "assist you.        Care EveryWhere ID     This is your Care EveryWhere ID. This could be used by other organizations to access your [place] medical records  [id number]        Your Vitals Were     Height BMI (Body Mass Index)                1.727 m (5' 8\") 22.35 kg/m2           Blood Pressure from Last 3 Encounters:   06/04/18 119/68   05/31/18 104/68   05/08/18 128/86    Weight from Last 3 Encounters:   06/04/18 66.7 kg (147 lb)   05/31/18 67 kg (147 lb 11.2 oz)   05/08/18 68.4 kg (150 lb 12.8 oz)              Today, you had the following     No orders found for display         Where to get your medicines      These medications were sent to [facility] [id number] - [place], [place] - [address]  [address] [facility] [address]     Phone:  [phone]     tamsulosin 0.4 MG capsule          Primary Care Provider Office Phone # Fax #    Jose Arrieta [phone] [address] [facility] [address]  [facility] [address]        Equal Access to Services     [facility]: Hadii aad ku hadasho Soomaali, waaxda luqadaha, qaybta kaalmada adeegyada, dave balbuena hayalejo boyer . So [facility] [place] [facility] [phone].    ATENCIÓN: Si habla español, tiene a delacruz disposición servicios gratuitos de asistencia lingüística. Llame al [phone].    We comply with applicable federal civil rights laws and [place] laws. We do not discriminate on the basis of race, color, national origin, age, disability, sex, sexual orientation, or gender identity.            Thank you!     Thank you for choosing [facility] [place]  for your care. Our goal is always to provide you with excellent care. Hearing back from our patients is one way we can continue to improve our services. Please take a few minutes to complete the written survey that you may receive in the mail after your visit with us. Thank you!             Your Updated Medication List - Protect others around you: Learn how to " safely use, store and throw away your medicines at www.disposemymeds.org.          This list is accurate as of 6/4/18  2:48 PM.  Always use your most recent med list.                   Brand Name Dispense Instructions for use Diagnosis    allopurinol 300 MG tablet    ZYLOPRIM    30 tablet    Take 1 tablet (300 mg) by mouth daily    Drug-induced gout of foot, unspecified chronicity, unspecified laterality       amLODIPine 5 MG tablet    NORVASC    90 tablet    Take 1 tablet (5 mg) by mouth daily    Benign essential hypertension       BENADRYL PO      Take by mouth nightly as needed        COENZYME Q-10 PO      Take 200 mg by mouth daily        EYE VITAMINS PO      Take 1 tablet by mouth daily        lisinopril 40 MG tablet    PRINIVIL/ZESTRIL    90 tablet    Take 1 tablet (40 mg) by mouth daily    Benign essential hypertension       metoprolol tartrate 50 MG tablet    LOPRESSOR    180 tablet    TK 1 T PO  BID.    Benign essential hypertension       pantoprazole 40 MG EC tablet    PROTONIX    30 tablet    Take 1 tablet (40 mg) by mouth daily Take 30-60 minutes before a meal.    Gastroesophageal reflux disease with esophagitis       rivaroxaban ANTICOAGULANT 20 MG Tabs tablet    XARELTO     Take 1 tablet (20 mg) by mouth daily (with dinner)        simvastatin 20 MG tablet    ZOCOR    90 tablet    Take 1 tablet (20 mg) by mouth At Bedtime    Hyperlipidemia, unspecified hyperlipidemia type       tamsulosin 0.4 MG capsule    FLOMAX    90 capsule    Take 1 capsule (0.4 mg) by mouth daily    Benign prostatic hyperplasia with urinary retention       VITAMIN B 12 PO      Take 1,000 mcg by mouth daily

## 2018-06-11 ENCOUNTER — MYC MEDICAL ADVICE (OUTPATIENT)
Dept: FAMILY MEDICINE | Facility: CLINIC | Age: 78
End: 2018-06-11

## 2018-06-11 DIAGNOSIS — I82.5Z9 CHRONIC DEEP VEIN THROMBOSIS (DVT) OF DISTAL VEIN OF LOWER EXTREMITY, UNSPECIFIED LATERALITY (H): Primary | ICD-10-CM

## 2018-06-11 NOTE — TELEPHONE ENCOUNTER
"Rivaroxaban 20 mg  Medication is reported/historical    Last Written Prescription Date:  04/06/18  Last Fill Quantity: ?,  # refills: ?   Last office visit: 5/31/2018 with prescribing provider:  Meenakshi   Future Office Visit:      Requested Prescriptions   Pending Prescriptions Disp Refills     rivaroxaban ANTICOAGULANT (XARELTO) 20 MG TABS tablet       Sig: Take 1 tablet (20 mg) by mouth daily (with dinner)    Platelet Inhibitors Failed    6/11/2018  2:29 PM       Failed - Normal ALT on file in past 12 months    Recent Labs   Lab Test  04/03/17   1716   ALT  27            Failed - Normal HGB on file in past 12 months    Recent Labs   Lab Test  03/21/18   1307   HGB  12.3*              Failed - Normal AST on file in past 12 months    Recent Labs   Lab Test  04/03/17   1716   AST  20            Passed - Normal Platelets on file in past 12 months    Recent Labs   Lab Test  03/21/18   1307   PLT  419              Passed - Recent (12 mo) or future (30 days) visit within the authorizing provider's specialty    Patient had office visit in the last 12 months or has a visit in the next 30 days with authorizing provider or within the authorizing provider's specialty.  See \"Patient Info\" tab in inbasket, or \"Choose Columns\" in Meds & Orders section of the refill encounter.           Passed - Patient is age 18 or older       Passed - Normal serum creatinine on file in past 12 months    Recent Labs   Lab Test  05/31/18   1137   CR  0.80               "

## 2018-06-12 ENCOUNTER — MYC REFILL (OUTPATIENT)
Dept: FAMILY MEDICINE | Facility: CLINIC | Age: 78
End: 2018-06-12

## 2018-06-12 DIAGNOSIS — I82.409 DEEP VEIN THROMBOSIS (DVT) OF LOWER EXTREMITY, UNSPECIFIED CHRONICITY, UNSPECIFIED LATERALITY, UNSPECIFIED VEIN (H): Primary | ICD-10-CM

## 2018-06-12 NOTE — TELEPHONE ENCOUNTER
Routing refill request to provider for review/approval because:  Labs out of range:  Hgb, ALT and AST  Medication is reported/historical  Please authorize if appropriate.  Thanks,  Karen Parra RN

## 2018-06-12 NOTE — TELEPHONE ENCOUNTER
Message from [employer]:  Original authorizing provider: MD Spenser Woodard would like a refill of the following medications:  rivaroxaban ANTICOAGULANT (XARELTO) 20 MG TABS tablet [Teja Vyas MD]    Preferred pharmacy: [id number] [address] [address]    Comment:

## 2018-06-13 NOTE — TELEPHONE ENCOUNTER
Routing refill request to provider for review/approval because:  HGB out of range  Keyla Hutson RN

## 2018-06-13 NOTE — TELEPHONE ENCOUNTER
"Requested Prescriptions   Pending Prescriptions Disp Refills     rivaroxaban ANTICOAGULANT (XARELTO) 20 MG TABS tablet       Sig: Take 1 tablet (20 mg) by mouth daily (with dinner)    Platelet Inhibitors Failed    6/12/2018  4:05 PM       Failed - Normal ALT on file in past 12 months    Recent Labs   Lab Test  04/03/17   1716   ALT  27            Failed - Normal HGB on file in past 12 months    Recent Labs   Lab Test  03/21/18   1307   HGB  12.3*              Failed - Normal AST on file in past 12 months    Recent Labs   Lab Test  04/03/17   1716   AST  20            Passed - Normal Platelets on file in past 12 months    Recent Labs   Lab Test  03/21/18   1307   PLT  419              Passed - Recent (12 mo) or future (30 days) visit within the authorizing provider's specialty    Patient had office visit in the last 12 months or has a visit in the next 30 days with authorizing provider or within the authorizing provider's specialty.  See \"Patient Info\" tab in inbasket, or \"Choose Columns\" in Meds & Orders section of the refill encounter.           Passed - Patient is age 18 or older       Passed - Normal serum creatinine on file in past 12 months    Recent Labs   Lab Test  05/31/18   1137   CR  0.80             Last Written Prescription Date:  04/06/18  Last Fill Quantity: UNK,  # refills: UNK   Last office visit: 5/31/2018 with prescribing provider:     Future Office Visit:  05/31/18      Gilma Leavitt MA    "

## 2018-06-14 ENCOUNTER — MYC REFILL (OUTPATIENT)
Dept: FAMILY MEDICINE | Facility: CLINIC | Age: 78
End: 2018-06-14

## 2018-06-14 NOTE — TELEPHONE ENCOUNTER
Please call pt. We don't have documented history of his presumed DVT.  When was DVT? Who did a workup and where? Do they know why he had a DVT? Is he supposed to be on lifelong anticoagulation? I sent in 1 month.   Thanks   JENN Urena CNP

## 2018-06-14 NOTE — TELEPHONE ENCOUNTER
"Rivaroxaban 20 mg  Medication listed as historical    Last Written Prescription Date:  04/06/18  Last Fill Quantity: ?,  # refills: ?   Last office visit: 5/31/2018 with prescribing provider:  Meenakshi   Future Office Visit:      Requested Prescriptions   Pending Prescriptions Disp Refills     rivaroxaban ANTICOAGULANT (XARELTO) 20 MG TABS tablet       Sig: Take 1 tablet (20 mg) by mouth daily (with dinner)    Platelet Inhibitors Failed    6/14/2018  2:16 PM       Failed - Normal ALT on file in past 12 months    Recent Labs   Lab Test  04/03/17   1716   ALT  27            Failed - Normal HGB on file in past 12 months    Recent Labs   Lab Test  03/21/18   1307   HGB  12.3*              Failed - Normal AST on file in past 12 months    Recent Labs   Lab Test  04/03/17   1716   AST  20            Passed - Normal Platelets on file in past 12 months    Recent Labs   Lab Test  03/21/18   1307   PLT  419              Passed - Recent (12 mo) or future (30 days) visit within the authorizing provider's specialty    Patient had office visit in the last 12 months or has a visit in the next 30 days with authorizing provider or within the authorizing provider's specialty.  See \"Patient Info\" tab in inbasket, or \"Choose Columns\" in Meds & Orders section of the refill encounter.           Passed - Patient is age 18 or older       Passed - Normal serum creatinine on file in past 12 months    Recent Labs   Lab Test  05/31/18   1137   CR  0.80               "

## 2018-06-14 NOTE — TELEPHONE ENCOUNTER
Message from [employer]:  Original authorizing provider: MD Spenser Woodard would like a refill of the following medications:  rivaroxaban ANTICOAGULANT (XARELTO) 20 MG TABS tablet [Teja Vyas MD]    Preferred pharmacy: [id number] [address] [address]    Comment:

## 2018-06-18 NOTE — TELEPHONE ENCOUNTER
KIRSTEN Porter:  Patient states he fractured hip from a fall February 2017  DVT discovered and occurred because of that    Workup done at [facility] ([facility]) in [place], [place]     Doesn't know if needs this lifelong - will be following up with Dr Arrieta soon  States when last US done - still had clot    Informed pt you refilled this for him  Nora BLANCHARD RN

## 2018-06-26 ENCOUNTER — OFFICE VISIT (OUTPATIENT)
Dept: FAMILY MEDICINE | Facility: CLINIC | Age: 78
End: 2018-06-26
Payer: COMMERCIAL

## 2018-06-26 VITALS
OXYGEN SATURATION: 97 % | WEIGHT: 147 LBS | HEART RATE: 86 BPM | TEMPERATURE: 97.4 F | BODY MASS INDEX: 22.28 KG/M2 | DIASTOLIC BLOOD PRESSURE: 75 MMHG | HEIGHT: 68 IN | SYSTOLIC BLOOD PRESSURE: 115 MMHG

## 2018-06-26 DIAGNOSIS — S32.9XXS CLOSED NONDISPLACED FRACTURE OF PELVIS, UNSPECIFIED PART OF PELVIS, SEQUELA: ICD-10-CM

## 2018-06-26 DIAGNOSIS — M10.279 DRUG-INDUCED GOUT OF FOOT, UNSPECIFIED CHRONICITY, UNSPECIFIED LATERALITY: Chronic | ICD-10-CM

## 2018-06-26 DIAGNOSIS — R97.20 ELEVATED PROSTATE SPECIFIC ANTIGEN (PSA): Chronic | ICD-10-CM

## 2018-06-26 DIAGNOSIS — E78.5 HYPERLIPIDEMIA, UNSPECIFIED HYPERLIPIDEMIA TYPE: Chronic | ICD-10-CM

## 2018-06-26 DIAGNOSIS — I82.5Y9 CHRONIC DEEP VEIN THROMBOSIS (DVT) OF PROXIMAL VEIN OF LOWER EXTREMITY, UNSPECIFIED LATERALITY (H): ICD-10-CM

## 2018-06-26 DIAGNOSIS — I10 BENIGN ESSENTIAL HYPERTENSION: Primary | Chronic | ICD-10-CM

## 2018-06-26 LAB — CRYSTALS SNV MICRO: ABNORMAL

## 2018-06-26 PROCEDURE — 99214 OFFICE O/P EST MOD 30 MIN: CPT | Performed by: INTERNAL MEDICINE

## 2018-06-26 PROCEDURE — 89060 EXAM SYNOVIAL FLUID CRYSTALS: CPT | Performed by: INTERNAL MEDICINE

## 2018-06-26 NOTE — MR AVS SNAPSHOT
After Visit Summary   6/26/2018    Spenser Biggs    MRN: [id number]           Patient Information     Date Of Birth          1940        Visit Information        Provider Department      6/26/2018 5:30 PM Jose Arrieta MD [facility]        Today's Diagnoses     Benign essential hypertension; goal < 140/90    -  1    Hyperlipidemia, unspecified hyperlipidemia type        Drug-induced gout of foot, unspecified chronicity, unspecified laterality        Elevated prostate specific antigen (PSA)        Closed nondisplaced fracture of pelvis, unspecified part of pelvis, sequela        Chronic deep vein thrombosis (DVT) of proximal vein of lower extremity, unspecified laterality (H)           Follow-ups after your visit        Your next 10 appointments already scheduled     Dec 10, 2018  1:00 PM CST   Return Visit with Bhavin Etienne MD   [facility] [place] [facility] [place] [facility])    [address] [facility] [address]  [address]  [facility] [id number] [phone]              Future tests that were ordered for you today     Open Future Orders        Priority Expected Expires Ordered    Prostate spec antigen screen Routine  6/26/2019 6/26/2018            Who to contact     If you have questions or need follow up information about today's clinic visit or your schedule please contact [facility] directly at [phone].  Normal or non-critical lab and imaging results will be communicated to you by MyChart, letter or phone within 4 business days after the clinic has received the results. If you do not hear from us within 7 days, please contact the clinic through MyChart or phone. If you have a critical or abnormal lab result, we will notify you by phone as soon as possible.  Submit refill requests through [employer] or call your pharmacy and they will forward the refill request to us. Please allow 3 business days for your refill to be completed.        "   Additional Information About Your Visit        MyChart Information     [employer] gives you secure access to your electronic health record. If you see a primary care provider, you can also send messages to your care team and make appointments. If you have questions, please call your primary care clinic.  If you do not have a primary care provider, please call [phone] and they will assist you.        Care EveryWhere ID     This is your Care EveryWhere ID. This could be used by other organizations to access your [place] medical records  [id number]        Your Vitals Were     Pulse Temperature Height Pulse Oximetry BMI (Body Mass Index)       86 97.4  F (36.3  C) (Tympanic) 5' 8\" (1.727 m) 97% 22.35 kg/m2        Blood Pressure from Last 3 Encounters:   06/26/18 115/75   06/04/18 119/68   05/31/18 104/68    Weight from Last 3 Encounters:   06/26/18 147 lb (66.7 kg)   06/04/18 147 lb (66.7 kg)   05/31/18 147 lb 11.2 oz (67 kg)              We Performed the Following     Crystal ID synovial fluid          Today's Medication Changes          These changes are accurate as of 6/26/18 11:59 PM.  If you have any questions, ask your nurse or doctor.               These medicines have changed or have updated prescriptions.        Dose/Directions    rivaroxaban ANTICOAGULANT 20 MG Tabs tablet   Commonly known as:  XARELTO   This may have changed:  Another medication with the same name was removed. Continue taking this medication, and follow the directions you see here.   Used for:  Chronic deep vein thrombosis (DVT) of distal vein of lower extremity, unspecified laterality (H)   Changed by:  Jose Arrieta MD        Dose:  20 mg   Take 1 tablet (20 mg) by mouth daily (with dinner)   Quantity:  90 tablet   Refills:  3                Primary Care Provider Office Phone # Fax #    Jose Arrieta [phone] [address] [place] [place] [address]        Equal Access to Services     [facility] " AH: Hadii lincoln duartejared Grimaldo, waaxda luqadaha, qaybta kaalmada alicia, dave emiliain hayaayusef doranramya lopez merlin malin. So [facility] [phone].    ATENCIÓN: Si habla kathleen, tiene a delacruz disposición servicios gratuitos de asistencia lingüística. Llame al [phone].    We comply with applicable federal civil rights laws and [place] laws. We do not discriminate on the basis of race, color, national origin, age, disability, sex, sexual orientation, or gender identity.            Thank you!     Thank you for choosing [facility]  for your care. Our goal is always to provide you with excellent care. Hearing back from our patients is one way we can continue to improve our services. Please take a few minutes to complete the written survey that you may receive in the mail after your visit with us. Thank you!             Your Updated Medication List - Protect others around you: Learn how to safely use, store and throw away your medicines at www.disposemymeds.org.          This list is accurate as of 6/26/18 11:59 PM.  Always use your most recent med list.                   Brand Name Dispense Instructions for use Diagnosis    allopurinol 300 MG tablet    ZYLOPRIM    30 tablet    Take 1 tablet (300 mg) by mouth daily    Drug-induced gout of foot, unspecified chronicity, unspecified laterality       amLODIPine 5 MG tablet    NORVASC    90 tablet    Take 1 tablet (5 mg) by mouth daily    Benign essential hypertension       BENADRYL PO      Take by mouth nightly as needed        COENZYME Q-10 PO      Take 200 mg by mouth daily        EYE VITAMINS PO      Take 1 tablet by mouth daily        lisinopril 40 MG tablet    PRINIVIL/ZESTRIL    90 tablet    Take 1 tablet (40 mg) by mouth daily    Benign essential hypertension       metoprolol tartrate 50 MG tablet    LOPRESSOR    180 tablet    TK 1 T PO  BID.    Benign essential hypertension       pantoprazole 40 MG EC tablet    PROTONIX    30 tablet    Take 1 tablet (40  mg) by mouth daily Take 30-60 minutes before a meal.    Gastroesophageal reflux disease with esophagitis       rivaroxaban ANTICOAGULANT 20 MG Tabs tablet    XARELTO    90 tablet    Take 1 tablet (20 mg) by mouth daily (with dinner)    Chronic deep vein thrombosis (DVT) of distal vein of lower extremity, unspecified laterality (H)       simvastatin 20 MG tablet    ZOCOR    90 tablet    Take 1 tablet (20 mg) by mouth At Bedtime    Hyperlipidemia, unspecified hyperlipidemia type       tamsulosin 0.4 MG capsule    FLOMAX    90 capsule    Take 1 capsule (0.4 mg) by mouth daily    Benign prostatic hyperplasia with urinary retention       VITAMIN B 12 PO      Take 1,000 mcg by mouth daily

## 2018-06-26 NOTE — PROGRESS NOTES
SUBJECTIVE:   Spenser Biggs is a 78 year old male who presents to clinic today for the following health issues:    Musculoskeletal problem/pain    Duration: ongoing    Description  Location: right knee    Intensity:  moderate    Accompanying signs and symptoms: swelling and redness, some warmth    History  Previous similar problem: YES  Previous evaluation:  x-ray    Precipitating or alleviating factors:  Trauma or overuse: no   Aggravating factors include: standing    Therapies tried and outcome: nothing    The patient with a history of gout treated with allopurinol presents to the clinic for evaluation of right knee pain. The pt had a joint aspiration of the left knee done on 3/21/2018 which showed signs of gout. He then had a joint aspiration and steroid injection of the right knee on 5/8/2018. He reports that today his right knee has increased pain and swelling. The pt uses a wheeled walker to walk. He is interested in obtaining a Handicap Parking Permit.      The patient had blood clots in his left lower legs on 2/13/2018. He is anticoagulated on Xarelto. He is wondering how long he needs to stay on the medication.      Problem list and histories reviewed & adjusted, as indicated.  Additional history: as documented    Current Outpatient Prescriptions   Medication Sig Dispense Refill     allopurinol (ZYLOPRIM) 300 MG tablet Take 1 tablet (300 mg) by mouth daily 30 tablet 1     amLODIPine (NORVASC) 5 MG tablet Take 1 tablet (5 mg) by mouth daily 90 tablet 3     COENZYME Q-10 PO Take 200 mg by mouth daily       Cyanocobalamin (VITAMIN B 12 PO) Take 1,000 mcg by mouth daily        DiphenhydrAMINE HCl (BENADRYL PO) Take by mouth nightly as needed       lisinopril (PRINIVIL/ZESTRIL) 40 MG tablet Take 1 tablet (40 mg) by mouth daily 90 tablet 3     metoprolol tartrate (LOPRESSOR) 50 MG tablet TK 1 T PO  BID. 180 tablet 3     Multiple Vitamins-Minerals (EYE VITAMINS PO) Take 1 tablet by mouth daily       pantoprazole  "(PROTONIX) 40 MG EC tablet Take 1 tablet (40 mg) by mouth daily Take 30-60 minutes before a meal. 30 tablet 1     rivaroxaban ANTICOAGULANT (XARELTO) 20 MG TABS tablet Take 1 tablet (20 mg) by mouth daily (with dinner) 90 tablet 3     rivaroxaban ANTICOAGULANT (XARELTO) 20 MG TABS tablet Take 1 tablet (20 mg) by mouth daily (with dinner) 30 tablet 0     simvastatin (ZOCOR) 20 MG tablet Take 1 tablet (20 mg) by mouth At Bedtime 90 tablet 3     tamsulosin (FLOMAX) 0.4 MG capsule Take 1 capsule (0.4 mg) by mouth daily 90 capsule 3     BP Readings from Last 3 Encounters:   06/26/18 115/75   06/04/18 119/68   05/31/18 104/68    Wt Readings from Last 3 Encounters:   06/26/18 66.7 kg (147 lb)   06/04/18 66.7 kg (147 lb)   05/31/18 67 kg (147 lb 11.2 oz)             Reviewed and updated as needed this visit by clinical staff  Tobacco  Allergies  Meds       Reviewed and updated as needed this visit by Provider         ROS:  Constitutional, HEENT, cardiovascular, pulmonary, GI, , musculoskeletal, neuro, skin, endocrine and psych systems are negative, except as otherwise noted.    This document serves as a record of the services and decisions personally performed and made by Jose Arrieta MD. It was created on his behalf by Dia Acosta, a trained medical scribe. The creation of this document is based the provider's statements to the medical scribe. 6/26/2018  OBJECTIVE:   /75 (BP Location: Left arm, Cuff Size: Adult Regular)  Pulse 86  Temp 97.4  F (36.3  C) (Tympanic)  Ht 1.727 m (5' 8\")  Wt 66.7 kg (147 lb)  SpO2 97%  BMI 22.35 kg/m2  Body mass index is 22.35 kg/(m^2).  Neck was supple without adenopathy or thyromegaly his carotids were normal without bruits  Chest clear to auscultation and percussion  Cardiovascular S1 and S2 are physiologic without murmurs or gallops  Abdomen bowel sounds were normal.  There is no palpable mass or organomegaly  Extremities nontender with 2+ pretibial edema on the " right and 1-2+ on the left, no palpable cords or tenderness   Right Knee: warmth, flexion contracture, small Baker's cyst, small anterior synovial collection  Pulses pedal pulses are as described otherwise his pulses are bilaterally symmetrical throughout without bruits  Skin without significant abnormality    Procedure:  The right knee was cleaned and prepped with 21 gauge needle. The effusion was removed without difficulty. Total 8 cc of serosanguinous fluid was removed. Sample obtained for crystal evaluation.    Diagnostic Test Results:  Pending   ASSESSMENT/PLAN:   1. Benign essential hypertension; goal < 140/90  Controlled with amlodipine, lisinopril, and metoprolol.    2. Hyperlipidemia, unspecified hyperlipidemia type  Controlled with simvastatin.     3. Drug-induced gout of foot, unspecified chronicity, unspecified laterality  Labs pending, depending on the results consider cortisone injection for treatment of acute gout attack.   Continue with allopurinol.     4. Elevated prostate specific antigen (PSA)  - Prostate spec antigen screen    5. Closed nondisplaced fracture of pelvis, unspecified part of pelvis, sequela    6. Chronic deep vein thrombosis (DVT) of proximal vein of lower extremity, unspecified laterality (H)  Reviewed the Xarelto course and pt should take the medication until 8/13/2018    Handicap parking paperwork completed for six months     Jose Arrieta MD  [facility]    The information in this document, created by the medical scribe for me, accurately reflects the services I personally performed and the decisions made by me. I have reviewed and approved this document for accuracy prior to leaving the patient care area.  Jose Arrieta MD  6:07 PM, 06/26/18

## 2018-06-28 RX ORDER — COLCHICINE 0.6 MG/1
TABLET ORAL
Qty: 30 TABLET | Refills: 3 | Status: SHIPPED | OUTPATIENT
Start: 2018-06-28 | End: 2018-06-29

## 2018-06-29 ENCOUNTER — TELEPHONE (OUTPATIENT)
Dept: FAMILY MEDICINE | Facility: CLINIC | Age: 78
End: 2018-06-29

## 2018-06-29 DIAGNOSIS — M10.279 DRUG-INDUCED GOUT OF FOOT, UNSPECIFIED CHRONICITY, UNSPECIFIED LATERALITY: Chronic | ICD-10-CM

## 2018-06-29 RX ORDER — COLCHICINE 0.6 MG/1
TABLET ORAL
Qty: 30 TABLET | Refills: 3
Start: 2018-06-29 | End: 2019-02-06

## 2018-06-29 NOTE — TELEPHONE ENCOUNTER
Call to Dr. Arrieta to clarify dosage of Colchicine.  He states 0.6 mg PO QD.    Medication profile adjusted.  Patient notified.  Melissa Pleitez RN

## 2018-06-29 NOTE — PROGRESS NOTES
I called the patient and informed of results. The following changes were made to treatment:       Patient voices understanding and will contact me with any further questions.   The message Was left answering machine to add Colchicine daily. Return to clinic in one month  Jose Arrieta MD

## 2018-06-29 NOTE — TELEPHONE ENCOUNTER
Reason for Call:  Medication or medication refill:    Do you use a [facility]?  Name of the pharmacy and phone number for the current request:  [id number] - [place], [place] - [address]    Name of the medication requested:colchicine (COLCRYS) 0.6 MG tablet       Other request: pharmacy called  To clarify this medication   Can we leave a detailed message on this number? YES    Phone number patient can be reached at:     Best Time:     Call taken on 6/29/2018 at 10:35 AM by Yue Krishnan

## 2018-09-19 ENCOUNTER — MYC MEDICAL ADVICE (OUTPATIENT)
Dept: FAMILY MEDICINE | Facility: CLINIC | Age: 78
End: 2018-09-19

## 2018-09-19 NOTE — TELEPHONE ENCOUNTER
To PCP:     Please see message below and advise,     Pt inquires about continuing to use Xarelto. And the name of an eye doctor     Thank you,   Courtney MARQUEZ RN

## 2018-10-08 ENCOUNTER — OFFICE VISIT (OUTPATIENT)
Dept: FAMILY MEDICINE | Facility: CLINIC | Age: 78
End: 2018-10-08
Payer: COMMERCIAL

## 2018-10-08 VITALS
WEIGHT: 155 LBS | HEART RATE: 81 BPM | SYSTOLIC BLOOD PRESSURE: 140 MMHG | HEIGHT: 68 IN | DIASTOLIC BLOOD PRESSURE: 81 MMHG | TEMPERATURE: 97 F | BODY MASS INDEX: 23.49 KG/M2 | OXYGEN SATURATION: 98 %

## 2018-10-08 DIAGNOSIS — R97.20 ELEVATED PROSTATE SPECIFIC ANTIGEN (PSA): Chronic | ICD-10-CM

## 2018-10-08 DIAGNOSIS — M10.279 DRUG-INDUCED GOUT OF FOOT, UNSPECIFIED CHRONICITY, UNSPECIFIED LATERALITY: Chronic | ICD-10-CM

## 2018-10-08 DIAGNOSIS — I82.5Y9 CHRONIC DEEP VEIN THROMBOSIS (DVT) OF PROXIMAL VEIN OF LOWER EXTREMITY, UNSPECIFIED LATERALITY (H): ICD-10-CM

## 2018-10-08 DIAGNOSIS — E78.5 HYPERLIPIDEMIA, UNSPECIFIED HYPERLIPIDEMIA TYPE: Chronic | ICD-10-CM

## 2018-10-08 DIAGNOSIS — I10 BENIGN ESSENTIAL HYPERTENSION: Primary | Chronic | ICD-10-CM

## 2018-10-08 DIAGNOSIS — Z23 NEED FOR PROPHYLACTIC VACCINATION AND INOCULATION AGAINST INFLUENZA: ICD-10-CM

## 2018-10-08 PROCEDURE — 99214 OFFICE O/P EST MOD 30 MIN: CPT | Mod: 25 | Performed by: INTERNAL MEDICINE

## 2018-10-08 PROCEDURE — 90662 IIV NO PRSV INCREASED AG IM: CPT | Performed by: INTERNAL MEDICINE

## 2018-10-08 PROCEDURE — G0008 ADMIN INFLUENZA VIRUS VAC: HCPCS | Performed by: INTERNAL MEDICINE

## 2018-10-08 NOTE — PROGRESS NOTES
SUBJECTIVE:   Spenser Biggs is a 78 year old male who presents to clinic today for the following health issues:      Follow up with anticoagulation therapy for DVT post fracture 8 months ago.  Patient has regained strength and is ambulating without his walker over the last 1 month.  He states that he is walking a mile regularly within 30 minutes without dyspnea or significant leg pain.  He continues to have minor venous stasis left greater than the right associated with his fracture.    He recently was on traveling with his daughter and after a long car drive from [place] to [place] the next day with getting up quickly he felt faint without true loss of consciousness but because of the faintness his daughter recommended follow-up in the emergency room.  Emergency room evaluation showed no pathological process and it was thought that his weakness was related to relative dehydration.  He has had no sequelae or recurrence of his near syncope.    Note minor weight gain with a well-balanced nutritious diet plus regular exercise        Problem list and histories reviewed & adjusted, as indicated.  Additional history: as documented    Current Outpatient Prescriptions   Medication Sig Dispense Refill     allopurinol (ZYLOPRIM) 300 MG tablet TAKE ONE TABLET BY MOUTH ONE TIME DAILY  90 tablet 3     amLODIPine (NORVASC) 5 MG tablet Take 1 tablet (5 mg) by mouth daily 90 tablet 3     COENZYME Q-10 PO Take 200 mg by mouth daily       colchicine (COLCRYS) 0.6 MG tablet Take 1 tabs (0.6) by mouth po qd.  RTC in 1 mo. 30 tablet 3     Cyanocobalamin (VITAMIN B 12 PO) Take 1,000 mcg by mouth daily        DiphenhydrAMINE HCl (BENADRYL PO) Take by mouth nightly as needed       lisinopril (PRINIVIL/ZESTRIL) 40 MG tablet Take 1 tablet (40 mg) by mouth daily 90 tablet 3     metoprolol tartrate (LOPRESSOR) 50 MG tablet TK 1 T PO  BID. 180 tablet 3     Multiple Vitamins-Minerals (EYE VITAMINS PO) Take 1 tablet by mouth daily        "pantoprazole (PROTONIX) 40 MG EC tablet Take 1 tablet (40 mg) by mouth daily Take 30-60 minutes before a meal. 30 tablet 1     rivaroxaban ANTICOAGULANT (XARELTO) 20 MG TABS tablet Take 1 tablet (20 mg) by mouth daily (with dinner) 90 tablet 3     simvastatin (ZOCOR) 20 MG tablet Take 1 tablet (20 mg) by mouth At Bedtime 90 tablet 3     tamsulosin (FLOMAX) 0.4 MG capsule Take 1 capsule (0.4 mg) by mouth daily 90 capsule 3       Reviewed and updated as needed this visit by clinical staff       Reviewed and updated as needed this visit by Provider         ROS:  Constitutional, HEENT, cardiovascular, pulmonary, gi and gu systems are negative, except as otherwise noted.    OBJECTIVE:     /81 (BP Location: Right arm, Patient Position: Chair, Cuff Size: Adult Regular)  Pulse 81  Temp 97  F (36.1  C) (Oral)  Ht 5' 8\" (1.727 m)  Wt 155 lb (70.3 kg)  SpO2 98%  BMI 23.57 kg/m2  Body mass index is 23.57 kg/(m^2).  GENERAL: healthy, alert and no distress  NECK: no adenopathy, no asymmetry, masses, or scars and thyroid normal to palpation  RESP: lungs clear to auscultation - no rales, rhonchi or wheezes  CV: regular rate and rhythm, normal S1 S2, no S3 or S4, no murmur, click or rub, no peripheral edema and peripheral pulses strong  ABDOMEN: soft, nontender, no hepatosplenomegaly, no masses and bowel sounds normal  MS: no gross musculoskeletal defects noted, trace to 1+ pretibial edema on the right, 1+ on the left  No palpable calf tenderness or cords medial thighs nontender        ASSESSMENT/PLAN:             1. Benign essential hypertension; goal < 140/90    - FLU VACCINE, INCREASED ANTIGEN, PRESV FREE    2. Hyperlipidemia, unspecified hyperlipidemia type      3. Drug-induced gout of foot, unspecified chronicity, unspecified laterality      4. Elevated prostate specific antigen (PSA)      5. Chronic deep vein thrombosis (DVT) of proximal vein of lower extremity, unspecified laterality (H)  Patient has had an " uneventful course of his post fracture DVT.  Recommended discontinuing Xarelto.  Patient will return to clinic in 2-3 weeks for a wellness exam and reevaluation of his history of DVT.    6. Need for prophylactic vaccination and inoculation against influenza      Schedule follow-up wellness exam.      Jose Arrieta MD  [facility]

## 2018-10-08 NOTE — MR AVS SNAPSHOT
After Visit Summary   10/8/2018    Spenser Biggs    MRN: [id number]           Patient Information     Date Of Birth          1940        Visit Information        Provider Department      10/8/2018 3:30 PM Jose Arrieta MD [facility]        Today's Diagnoses     Benign essential hypertension; goal < 140/90    -  1    Hyperlipidemia, unspecified hyperlipidemia type        Drug-induced gout of foot, unspecified chronicity, unspecified laterality        Elevated prostate specific antigen (PSA)        Chronic deep vein thrombosis (DVT) of proximal vein of lower extremity, unspecified laterality (H)        Need for prophylactic vaccination and inoculation against influenza           Follow-ups after your visit        Follow-up notes from your care team     Return in about 3 weeks (around 10/29/2018).      Your next 10 appointments already scheduled     Dec 10, 2018  1:00 PM CST   Return Visit with Bhavin Etienne MD   [facility] ([facility])    [address]  [address]  [facility] [id number] [phone]              Who to contact     If you have questions or need follow up information about today's clinic visit or your schedule please contact [facility] directly at [phone].  Normal or non-critical lab and imaging results will be communicated to you by MyChart, letter or phone within 4 business days after the clinic has received the results. If you do not hear from us within 7 days, please contact the clinic through MyChart or phone. If you have a critical or abnormal lab result, we will notify you by phone as soon as possible.  Submit refill requests through [employer] or call your pharmacy and they will forward the refill request to us. Please allow 3 business days for your refill to be completed.          Additional Information About Your Visit        MyChart Information     [employer] gives you secure  "access to your electronic health record. If you see a primary care provider, you can also send messages to your care team and make appointments. If you have questions, please call your primary care clinic.  If you do not have a primary care provider, please call [phone] and they will assist you.        Care EveryWhere ID     This is your Care EveryWhere ID. This could be used by other organizations to access your [place] medical records  [id number]        Your Vitals Were     Pulse Temperature Height Pulse Oximetry BMI (Body Mass Index)       81 97  F (36.1  C) (Oral) 5' 8\" (1.727 m) 98% 23.57 kg/m2        Blood Pressure from Last 3 Encounters:   10/08/18 140/81   06/26/18 115/75   06/04/18 119/68    Weight from Last 3 Encounters:   10/08/18 155 lb (70.3 kg)   06/26/18 147 lb (66.7 kg)   06/04/18 147 lb (66.7 kg)              We Performed the Following     FLU VACCINE, INCREASED ANTIGEN, PRESV FREE        Primary Care Provider Office Phone # Fax #    Jose Arrieta [phone] [address]  [facility] [address]        Equal Access to Services     ÁLVARO JACQUES : Hadii aad ku hadasho Soomaali, waaxda luqadaha, qaybta kaalmada adeegyada, waxay emiliain hayhafsan willem boyer . So [facility] [phone].    ATENCIÓN: Si habla español, tiene a delacruz disposición servicios gratuitos de asistencia lingüística. Llame al [phone].    We comply with applicable federal civil rights laws and [place] laws. We do not discriminate on the basis of race, color, national origin, age, disability, sex, sexual orientation, or gender identity.            Thank you!     Thank you for choosing [facility]  for your care. Our goal is always to provide you with excellent care. Hearing back from our patients is one way we can continue to improve our services. Please take a few minutes to complete the written survey that you may receive in the mail after your visit with us. Thank you!      "        Your Updated Medication List - Protect others around you: Learn how to safely use, store and throw away your medicines at www.disposemymeds.org.          This list is accurate as of 10/8/18 11:59 PM.  Always use your most recent med list.                   Brand Name Dispense Instructions for use Diagnosis    allopurinol 300 MG tablet    ZYLOPRIM    90 tablet    TAKE ONE TABLET BY MOUTH ONE TIME DAILY    Drug-induced gout of foot, unspecified chronicity, unspecified laterality       amLODIPine 5 MG tablet    NORVASC    90 tablet    Take 1 tablet (5 mg) by mouth daily    Benign essential hypertension       BENADRYL PO      Take by mouth nightly as needed        COENZYME Q-10 PO      Take 200 mg by mouth daily        colchicine 0.6 MG tablet    COLCRYS    30 tablet    Take 1 tabs (0.6) by mouth po qd. RTC in 1 mo.    Drug-induced gout of foot, unspecified chronicity, unspecified laterality       EYE VITAMINS PO      Take 1 tablet by mouth daily        lisinopril 40 MG tablet    PRINIVIL/ZESTRIL    90 tablet    Take 1 tablet (40 mg) by mouth daily    Benign essential hypertension       metoprolol tartrate 50 MG tablet    LOPRESSOR    180 tablet    TK 1 T PO  BID.    Benign essential hypertension       pantoprazole 40 MG EC tablet    PROTONIX    30 tablet    Take 1 tablet (40 mg) by mouth daily Take 30-60 minutes before a meal.    Gastroesophageal reflux disease with esophagitis       rivaroxaban ANTICOAGULANT 20 MG Tabs tablet    XARELTO    90 tablet    Take 1 tablet (20 mg) by mouth daily (with dinner)    Chronic deep vein thrombosis (DVT) of distal vein of lower extremity, unspecified laterality (H)       simvastatin 20 MG tablet    ZOCOR    90 tablet    Take 1 tablet (20 mg) by mouth At Bedtime    Hyperlipidemia, unspecified hyperlipidemia type       tamsulosin 0.4 MG capsule    FLOMAX    90 capsule    Take 1 capsule (0.4 mg) by mouth daily    Benign prostatic hyperplasia with urinary retention       VITAMIN  B 12 PO      Take 1,000 mcg by mouth daily

## 2018-10-08 NOTE — PROGRESS NOTES

## 2018-12-06 DIAGNOSIS — R33.9 URINARY RETENTION: Primary | ICD-10-CM

## 2018-12-06 DIAGNOSIS — R97.20 ELEVATED PROSTATE SPECIFIC ANTIGEN (PSA): ICD-10-CM

## 2018-12-10 ENCOUNTER — OFFICE VISIT (OUTPATIENT)
Dept: UROLOGY | Facility: CLINIC | Age: 78
End: 2018-12-10
Payer: COMMERCIAL

## 2018-12-10 VITALS
BODY MASS INDEX: 22.96 KG/M2 | WEIGHT: 155 LBS | HEART RATE: 72 BPM | SYSTOLIC BLOOD PRESSURE: 120 MMHG | HEIGHT: 69 IN | DIASTOLIC BLOOD PRESSURE: 70 MMHG

## 2018-12-10 DIAGNOSIS — R33.9 URINARY RETENTION: ICD-10-CM

## 2018-12-10 DIAGNOSIS — R97.20 ELEVATED PROSTATE SPECIFIC ANTIGEN (PSA): ICD-10-CM

## 2018-12-10 LAB
ALBUMIN UR-MCNC: NEGATIVE MG/DL
APPEARANCE UR: CLEAR
BILIRUB UR QL STRIP: NEGATIVE
COLOR UR AUTO: YELLOW
GLUCOSE UR STRIP-MCNC: NEGATIVE MG/DL
HGB UR QL STRIP: NEGATIVE
KETONES UR STRIP-MCNC: NEGATIVE MG/DL
LEUKOCYTE ESTERASE UR QL STRIP: ABNORMAL
NITRATE UR QL: NEGATIVE
PH UR STRIP: 7 PH (ref 5–7)
PSA SERPL-MCNC: 11.5 NG/ML (ref 0–4)
RESIDUAL VOLUME (RV) (EXTERNAL): 29
SOURCE: ABNORMAL
SP GR UR STRIP: 1.02 (ref 1–1.03)
UROBILINOGEN UR STRIP-ACNC: 0.2 EU/DL (ref 0.2–1)

## 2018-12-10 PROCEDURE — 51798 US URINE CAPACITY MEASURE: CPT | Performed by: UROLOGY

## 2018-12-10 PROCEDURE — 81003 URINALYSIS AUTO W/O SCOPE: CPT | Performed by: UROLOGY

## 2018-12-10 PROCEDURE — 36415 COLL VENOUS BLD VENIPUNCTURE: CPT | Performed by: UROLOGY

## 2018-12-10 PROCEDURE — 99213 OFFICE O/P EST LOW 20 MIN: CPT | Mod: 25 | Performed by: UROLOGY

## 2018-12-10 PROCEDURE — 84153 ASSAY OF PSA TOTAL: CPT | Performed by: UROLOGY

## 2018-12-10 ASSESSMENT — PAIN SCALES - GENERAL: PAINLEVEL: NO PAIN (0)

## 2018-12-10 ASSESSMENT — MIFFLIN-ST. JEOR: SCORE: 1413.46

## 2018-12-10 NOTE — PROGRESS NOTES
"  CHIEF COMPLAINT  It was my pleasure to see Spenser Biggs who is a 78 year old male for follow-up of urinary retention and elevated PSA.      HPI  Spenser Biggs is a very pleasant 78 year old male who presents with a history of urinary retention. Patient has a history of a fall on the ice and was treated in hospital in [place] in January 2018 secondary to pelvic fracture with subsequent long rehab. At that time, was found to be in urinary retention. Failed TOV both in hospital and at rehab. But ultimately passed TOV in our clinic. Has since been voiding well without complaint. No hematuria or dysuria. Taking tamsulosin.     Patient reports no issues with voiding previously. But does have history of BPH. Previous prostate biopsy 4 years ago, were negative - PSA around 7 at that time.     PSA  11/21/2017 - 9.5    PHYSICAL EXAM  Patient is a 78 year old  male   Vitals: Blood pressure 120/70, pulse 72, height 1.753 m (5' 9\"), weight 70.3 kg (155 lb).  General Appearance Adult: Body mass index is 22.89 kg/m .  Alert, no acute distress, oriented  HENT: throat/mouth:normal, good dentition  Lungs: no respiratory distress, or pursed lip breathing  Heart: No obvious jugular venous distension present  Abdomen: soft, nontender, no organomegaly or masses;  Back: no CVAT  Musculoskeltal: extremities normal, no peripheral edema  Skin: no suspicious lesions or rashes  Neuro: Alert, oriented, speech and mentation normal  Psych: affect and mood normal  Gait: Normal    PVR = 29 ml    ASSESSMENT and PLAN  78 year old male with history of urinary retention. Voiding well currently and doing well on tamsulosin. We also discussed his rather long history of elevated PSA and previous negative biopsies. Will monitor this again today, but plan to continue observation pending results.     - continue tamsulosin  - Follow-up 6 months for PVR and PSA  - Will call with his PSA results from today    I spent over 15 minutes with the patient.  Over " half this time was spent on counseling regarding BPH with urinary retention, and elevated PSA.    Bhavin Etienne MD  Urology  [facility]  Clinic Phone [phone]

## 2018-12-10 NOTE — NURSING NOTE
Here for 6 month follow up. PSA done today . No longer taking Flomax  Feels he  Is urinating well . PVR by bladder scan is 29 ml.Mariola Hunter LPN

## 2018-12-29 DIAGNOSIS — E78.5 HYPERLIPIDEMIA, UNSPECIFIED HYPERLIPIDEMIA TYPE: ICD-10-CM

## 2018-12-29 NOTE — TELEPHONE ENCOUNTER
"Requested Prescriptions   Pending Prescriptions Disp Refills     simvastatin (ZOCOR) 20 MG tablet [Pharmacy Med Name: Simvastatin Oral Tablet 20 MG]  Last Written Prescription Date:  10/31/17  Last Fill Quantity: 90 TABLET,  # refills: 3   Last office visit: 10/8/2018 with prescribing provider:  ARLETTE   Future Office Visit:     90 tablet 2     Sig: TAKE 1 TABLET (20 MG) BY MOUTH AT BEDTIME    Statins Protocol Failed - 12/29/2018  2:38 PM       Failed - LDL on file in past 12 months    Recent Labs   Lab Test 03/30/17  1003   LDL 94            Passed - No abnormal creatine kinase in past 12 months    No lab results found.            Passed - Recent (12 mo) or future (30 days) visit within the authorizing provider's specialty    Patient had office visit in the last 12 months or has a visit in the next 30 days with authorizing provider or within the authorizing provider's specialty.  See \"Patient Info\" tab in inbasket, or \"Choose Columns\" in Meds & Orders section of the refill encounter.             Passed - Patient is age 18 or older          "

## 2019-01-02 NOTE — TELEPHONE ENCOUNTER
Routing refill request to provider for review/approval because:  Labs not current:  Yearly lipid panel needed  Nora BLANCHARD RN

## 2019-01-03 ENCOUNTER — TELEPHONE (OUTPATIENT)
Dept: SURGERY | Facility: CLINIC | Age: 79
End: 2019-01-03

## 2019-01-03 RX ORDER — SIMVASTATIN 20 MG
20 TABLET ORAL AT BEDTIME
Qty: 90 TABLET | Refills: 2 | Status: SHIPPED | OUTPATIENT
Start: 2019-01-03 | End: 2019-10-16

## 2019-01-03 NOTE — TELEPHONE ENCOUNTER
Called and discussed recent PSA result. His PSA is rather high at baseline and has history of previous negative biopsy. At this point, will check again in 6 months and consider biopsy or MRI if continues to rise. He is in agreement with plan.    Bhavin Etienne MD  Urology  [facility]  Clinic Phone [phone]

## 2019-01-12 DIAGNOSIS — I10 BENIGN ESSENTIAL HYPERTENSION: ICD-10-CM

## 2019-01-14 NOTE — TELEPHONE ENCOUNTER
"Pending Prescriptions:                       Disp   Refills    amLODIPine (NORVASC) 5 MG tablet [Pharmac*90 tab*2            Sig: TAKE 1 TABLET (5 MG) BY MOUTH DAILY    Last Written Prescription Date:  11/21/17  Last Fill Quantity: 90,  # refills: 3   Last office visit: 10/8/2018 with prescribing provider:     Future Office Visit:    Requested Prescriptions   Pending Prescriptions Disp Refills     amLODIPine (NORVASC) 5 MG tablet [Pharmacy Med Name: AmLODIPine Besylate Oral Tablet 5 MG] 90 tablet 2     Sig: TAKE 1 TABLET (5 MG) BY MOUTH DAILY    Calcium Channel Blockers Protocol  Passed - 1/12/2019 11:45 AM       Passed - Blood pressure under 140/90 in past 12 months    BP Readings from Last 3 Encounters:   12/10/18 120/70   10/08/18 140/81   06/26/18 115/75                Passed - Recent (12 mo) or future (30 days) visit within the authorizing provider's specialty    Patient had office visit in the last 12 months or has a visit in the next 30 days with authorizing provider or within the authorizing provider's specialty.  See \"Patient Info\" tab in inbasket, or \"Choose Columns\" in Meds & Orders section of the refill encounter.             Passed - Medication is active on med list       Passed - Patient is age 18 or older       Passed - Normal serum creatinine on file in past 12 months    Recent Labs   Lab Test 05/31/18  1137   CR 0.80                     "

## 2019-01-15 RX ORDER — AMLODIPINE BESYLATE 5 MG/1
5 TABLET ORAL DAILY
Qty: 90 TABLET | Refills: 0 | Status: SHIPPED | OUTPATIENT
Start: 2019-01-15 | End: 2019-04-02

## 2019-01-15 NOTE — TELEPHONE ENCOUNTER
Routing refill request to provider for review/approval because:  A break in medication last rx would have been completed 11/21/18.  Please authorize if appropriate.  Thanks,  Karen Parra RN    TCs'  Please schedule pt for annul physical (per last OV note).  Thanks,  Karen Parra RN

## 2019-01-15 NOTE — TELEPHONE ENCOUNTER
Next 5 appointments (look out 90 days)    Feb 06, 2019  8:00 AM CST  PHYSICAL with Jose Arrieta MD  [facility] [address] [facility] [address]  [phone]        Prescription approved per [facility] Ref[facility] Protocol.  Maria Guadalupe NAVA RN

## 2019-02-06 ENCOUNTER — OFFICE VISIT (OUTPATIENT)
Dept: FAMILY MEDICINE | Facility: CLINIC | Age: 79
End: 2019-02-06
Payer: COMMERCIAL

## 2019-02-06 VITALS
HEIGHT: 69 IN | HEART RATE: 71 BPM | DIASTOLIC BLOOD PRESSURE: 85 MMHG | TEMPERATURE: 97.1 F | SYSTOLIC BLOOD PRESSURE: 135 MMHG | OXYGEN SATURATION: 97 % | WEIGHT: 161 LBS | BODY MASS INDEX: 23.85 KG/M2

## 2019-02-06 DIAGNOSIS — E78.5 HYPERLIPIDEMIA, UNSPECIFIED HYPERLIPIDEMIA TYPE: ICD-10-CM

## 2019-02-06 DIAGNOSIS — Z12.5 SCREENING FOR PROSTATE CANCER: ICD-10-CM

## 2019-02-06 DIAGNOSIS — K21.9 GASTROESOPHAGEAL REFLUX DISEASE WITHOUT ESOPHAGITIS: ICD-10-CM

## 2019-02-06 DIAGNOSIS — E53.8 VITAMIN B12 DEFICIENCY (NON ANEMIC): ICD-10-CM

## 2019-02-06 DIAGNOSIS — R97.20 ELEVATED PROSTATE SPECIFIC ANTIGEN (PSA): ICD-10-CM

## 2019-02-06 DIAGNOSIS — M10.279 DRUG-INDUCED GOUT OF FOOT, UNSPECIFIED CHRONICITY, UNSPECIFIED LATERALITY: ICD-10-CM

## 2019-02-06 DIAGNOSIS — Z00.00 ROUTINE GENERAL MEDICAL EXAMINATION AT A HEALTH CARE FACILITY: ICD-10-CM

## 2019-02-06 DIAGNOSIS — S32.9XXS CLOSED NONDISPLACED FRACTURE OF PELVIS, UNSPECIFIED PART OF PELVIS, SEQUELA: ICD-10-CM

## 2019-02-06 DIAGNOSIS — E55.9 VITAMIN D DEFICIENCY: ICD-10-CM

## 2019-02-06 DIAGNOSIS — R53.83 FATIGUE, UNSPECIFIED TYPE: ICD-10-CM

## 2019-02-06 DIAGNOSIS — I10 BENIGN ESSENTIAL HYPERTENSION: ICD-10-CM

## 2019-02-06 DIAGNOSIS — I82.5Y9 CHRONIC DEEP VEIN THROMBOSIS (DVT) OF PROXIMAL VEIN OF LOWER EXTREMITY, UNSPECIFIED LATERALITY (H): Primary | ICD-10-CM

## 2019-02-06 LAB
ALBUMIN UR-MCNC: NEGATIVE MG/DL
APPEARANCE UR: CLEAR
BACTERIA #/AREA URNS HPF: ABNORMAL /HPF
BILIRUB UR QL STRIP: NEGATIVE
COLOR UR AUTO: YELLOW
ERYTHROCYTE [DISTWIDTH] IN BLOOD BY AUTOMATED COUNT: 13.5 % (ref 10–15)
GLUCOSE UR STRIP-MCNC: NEGATIVE MG/DL
HCT VFR BLD AUTO: 48 % (ref 40–53)
HGB BLD-MCNC: 16.1 G/DL (ref 13.3–17.7)
HGB UR QL STRIP: NEGATIVE
HYALINE CASTS #/AREA URNS LPF: ABNORMAL /LPF
KETONES UR STRIP-MCNC: NEGATIVE MG/DL
LEUKOCYTE ESTERASE UR QL STRIP: ABNORMAL
MCH RBC QN AUTO: 33 PG (ref 26.5–33)
MCHC RBC AUTO-ENTMCNC: 33.5 G/DL (ref 31.5–36.5)
MCV RBC AUTO: 98 FL (ref 78–100)
MUCOUS THREADS #/AREA URNS LPF: PRESENT /LPF
NITRATE UR QL: NEGATIVE
NON-SQ EPI CELLS #/AREA URNS LPF: ABNORMAL /LPF
PH UR STRIP: 6 PH (ref 5–7)
PLATELET # BLD AUTO: 199 10E9/L (ref 150–450)
RBC # BLD AUTO: 4.88 10E12/L (ref 4.4–5.9)
RBC #/AREA URNS AUTO: ABNORMAL /HPF
SOURCE: ABNORMAL
SP GR UR STRIP: >1.03 (ref 1–1.03)
UROBILINOGEN UR STRIP-ACNC: 0.2 EU/DL (ref 0.2–1)
VIT B12 SERPL-MCNC: 748 PG/ML (ref 193–986)
WBC # BLD AUTO: 6.4 10E9/L (ref 4–11)
WBC #/AREA URNS AUTO: ABNORMAL /HPF

## 2019-02-06 PROCEDURE — 99397 PER PM REEVAL EST PAT 65+ YR: CPT | Performed by: INTERNAL MEDICINE

## 2019-02-06 PROCEDURE — 84550 ASSAY OF BLOOD/URIC ACID: CPT | Performed by: INTERNAL MEDICINE

## 2019-02-06 PROCEDURE — 82306 VITAMIN D 25 HYDROXY: CPT | Performed by: INTERNAL MEDICINE

## 2019-02-06 PROCEDURE — 85027 COMPLETE CBC AUTOMATED: CPT | Performed by: INTERNAL MEDICINE

## 2019-02-06 PROCEDURE — 80061 LIPID PANEL: CPT | Performed by: INTERNAL MEDICINE

## 2019-02-06 PROCEDURE — 82607 VITAMIN B-12: CPT | Performed by: INTERNAL MEDICINE

## 2019-02-06 PROCEDURE — 81001 URINALYSIS AUTO W/SCOPE: CPT | Performed by: INTERNAL MEDICINE

## 2019-02-06 PROCEDURE — 36415 COLL VENOUS BLD VENIPUNCTURE: CPT | Performed by: INTERNAL MEDICINE

## 2019-02-06 PROCEDURE — 80053 COMPREHEN METABOLIC PANEL: CPT | Performed by: INTERNAL MEDICINE

## 2019-02-06 PROCEDURE — 84443 ASSAY THYROID STIM HORMONE: CPT | Performed by: INTERNAL MEDICINE

## 2019-02-06 ASSESSMENT — MIFFLIN-ST. JEOR: SCORE: 1440.67

## 2019-02-06 NOTE — PATIENT INSTRUCTIONS
Preventive Health Recommendations:     See your health care provider every year to    Review health changes.     Discuss preventive care.      Review your medicines if your doctor has prescribed any.    Talk with your health care provider about whether you should have a test to screen for prostate cancer (PSA).    Every 3 years, have a diabetes test (fasting glucose). If you are at risk for diabetes, you should have this test more often.    Every 5 years, have a cholesterol test. Have this test more often if you are at risk for high cholesterol or heart disease.     Every 10 years, have a colonoscopy. Or, have a yearly FIT test (stool test). These exams will check for colon cancer.    Talk to with your health care provider about screening for Abdominal Aortic Aneurysm if you have a family history of AAA or have a history of smoking.  Shots:     Get a flu shot each year.     Get a tetanus shot every 10 years.     Talk to your doctor about your pneumonia vaccines. There are now two you should receive - Pneumovax (PPSV 23) and Prevnar (PCV 13).    Talk to your pharmacist about a shingles vaccine.     Talk to your doctor about the hepatitis B vaccine.  Nutrition:     Eat at least 5 servings of fruits and vegetables each day.     Eat whole-grain bread, whole-wheat pasta and brown rice instead of white grains and rice.     Get adequate Calcium and Vitamin D.   Lifestyle    Exercise for at least 150 minutes a week (30 minutes a day, 5 days a week). This will help you control your weight and prevent disease.     Limit alcohol to one drink per day.     No smoking.     Wear sunscreen to prevent skin cancer.     See your dentist every six months for an exam and cleaning.     See your eye doctor every 1 to 2 years to screen for conditions such as glaucoma, macular degeneration and cataracts.    Personalized Prevention Plan  You are due for the preventive services outlined below.  Your care team is available to assist you in  scheduling these services.  If you have already completed any of these items, please share that information with your care team to update in your medical record.    Health Maintenance Due   Topic Date Due     Zoster (Shingles) Vaccine (1 of 2) 03/11/1990     Discuss Advance Directive Planning  03/11/1995

## 2019-02-06 NOTE — PROGRESS NOTES
"  SUBJECTIVE:   Spenser Biggs is a 78 year old male who presents for Preventive Visit.  Doing well off Xarelto with minimal swelling of both of his legs.    He has paresthesias of both feet long lasting and unchanged.    Medications reconciled quit taking his vitamin B12 months ago    Are you in the first 12 months of your Medicare Part B coverage?  No    Physical Health:    In general, how would you rate your overall physical health? good    Outside of work, how many days during the week do you exercise? 4-5 days/week    Outside of work, approximately how many minutes a day do you exercise?30-45 minutes    If you drink alcohol do you typically have >3 drinks per day or >7 drinks per week? No    Do you usually eat at least 4 servings of fruit and vegetables a day, include whole grains & fiber and avoid regularly eating high fat or \"junk\" foods? Yes    Do you have any problems taking medications regularly?  No    Do you have any side effects from medications? none    Needs assistance for the following daily activities: no assistance needed    Which of the following safety concerns are present in your home?  none identified     Hearing impairment: Yes, Difficulty following a conversation in a noisy restaurant or crowded room.    In the past 6 months, have you been bothered by leaking of urine? no    Mental Health:    In general, how would you rate your overall mental or emotional health? good  PHQ-2 Score:      Do you feel safe in your environment? Yes    Do you have a Health Care Directive? Yes: Patient states has Advance Directive and will bring in a copy to clinic.    Additional concerns to address?  No    Fall risk:  Fallen 2 or more times in the past year?: No  Any fall with injury in the past year?: No  click delete button to remove this line now  Cognitive Screenin) Repeat 3 items (Leader, Season, Table)    2) Clock draw: NORMAL  3) 3 item recall: Recalls 3 objects  Results: 3 items recalled: COGNITIVE " IMPAIRMENT LESS LIKELY    Mini-CogTM Copyright ALESSANDRO Steen. Licensed by the author for use in [facility]; reprinted with permission (gloria@.[facility]). All rights reserved.      Do you have sleep apnea, excessive snoring or daytime drowsiness?: no            Reviewed and updated as needed this visit by clinical staff  Meds        reconciled  Reviewed and updated as needed this visit by Provider        Social History     Tobacco Use     Smoking status: Former Smoker     Smokeless tobacco: Never Used     Tobacco comment: 2 cigars per year occasionally   Substance Use Topics     Alcohol use: Yes     Alcohol/week: 0.0 oz     Comment: a beer at night                           Current providers sharing in care for this patient include:   Patient Care Team:  Jose Arrieta MD as PCP - General (Internal Medicine)  Jose Arrieta MD as PCP - Assigned PCP    The following health maintenance items are reviewed in Epic and correct as of today:  Health Maintenance   Topic Date Due     ZOSTER IMMUNIZATION (1 of 2) 03/11/1990     ADVANCE DIRECTIVE PLANNING Q5 YRS  03/11/1995     FALL RISK ASSESSMENT  10/08/2019     PHQ-2 Q1 YR  10/08/2019     LIPID SCREEN Q5 YR MALE (SYSTEM ASSIGNED)  03/30/2022     DTAP/TDAP/TD IMMUNIZATION (2 - Td) 04/03/2027     INFLUENZA VACCINE  Completed     IPV IMMUNIZATION  Aged Out     MENINGITIS IMMUNIZATION  Aged Out     Current Outpatient Medications   Medication Sig Dispense Refill     allopurinol (ZYLOPRIM) 300 MG tablet TAKE ONE TABLET BY MOUTH ONE TIME DAILY  90 tablet 3     amLODIPine (NORVASC) 5 MG tablet TAKE 1 TABLET (5 MG) BY MOUTH DAILY 90 tablet 0     COENZYME Q-10 PO Take 200 mg by mouth daily       DiphenhydrAMINE HCl (BENADRYL PO) Take by mouth nightly as needed       metoprolol tartrate (LOPRESSOR) 50 MG tablet TK 1 T PO  BID. 180 tablet 3     Multiple Vitamins-Minerals (EYE VITAMINS PO) Take 1 tablet by mouth daily       simvastatin (ZOCOR) 20 MG tablet TAKE 1 TABLET (20 MG)  "BY MOUTH AT BEDTIME 90 tablet 2     Allergies   Allergen Reactions     Seasonal Allergies      Hay Fever         ROS:  CONSTITUTIONAL: NEGATIVE for fever, chills, change in weight  INTEGUMENTARY/SKIN: NEGATIVE for worrisome rashes, moles or lesions  EYES: NEGATIVE for vision changes or irritation,  Macular degeneration of the right eye (dry ) prescribed Ocuvite preservation  ENT/MOUTH: NEGATIVE for ear, mouth and throat problems  RESP: NEGATIVE for significant cough or SOB  BREAST: NEGATIVE for masses, tenderness or discharge  CV: NEGATIVE for chest pain, palpitations or peripheral edema, walking 30-45 minutes regularly in the mall or neighborhood  GI: NEGATIVE for nausea, abdominal pain, heartburn, or change in bowel habits  : NEGATIVE for frequency, dysuria, or hematuria  MUSCULOSKELETAL: NEGATIVE for significant arthralgias or myalgia  NEURO: NEGATIVE for weakness, dizziness or paresthesias  ENDOCRINE: NEGATIVE for temperature intolerance, skin/hair changes  HEME: NEGATIVE for bleeding problems  PSYCHIATRIC: NEGATIVE for changes in mood or affect    OBJECTIVE:   There were no vitals taken for this visit. Estimated body mass index is 22.89 kg/m  as calculated from the following:    Height as of 12/10/18: 1.753 m (5' 9\").    Weight as of 12/10/18: 70.3 kg (155 lb).  EXAM:   GENERAL: healthy, alert and no distress  EYES: Eyes grossly normal to inspection, PERRL and conjunctivae and sclerae normal  HENT: ear canals and TM's normal, nose and mouth without ulcers or lesions  NECK: no adenopathy, no asymmetry, masses, or scars and thyroid normal to palpation  RESP: lungs clear to auscultation - no rales, rhonchi or wheezes, barrel chested  CV: regular rate and rhythm, normal S1 S2, no S3 or S4, no murmur, click or rub, no peripheral edema and peripheral pulses strong  ABDOMEN: soft, nontender, no hepatosplenomegaly, no masses and bowel sounds normal, prominent bulge of the linea alba   no inguinal masses or hernia " his testicles are normal bilaterally his penis is uncircumcised without any problems with his foreskin  Rectal exam anus was normal his prostate was incompletely examined palpable nodules  MS: no gross musculoskeletal defects noted, no edema  SKIN: no suspicious lesions or rashes  NEURO: Normal strength and tone, mentation intact and speech normal  PSYCH: mentation appears normal, affect normal/bright        ASSESSMENT / PLAN:   1. Chronic deep vein thrombosis (DVT) of proximal vein of lower extremity, unspecified laterality (H)  Resolved without any signs of recurrence or symptoms and secondary complications    2. Benign essential hypertension; goal < 140/90    - UA reflex to Microscopic and Culture  - CBC with platelets  - Comprehensive metabolic panel    3. Closed nondisplaced fracture of pelvis, unspecified part of pelvis, sequela      4. Drug-induced gout of foot, unspecified chronicity, unspecified laterality    - Uric acid    5. Gastroesophageal reflux disease without esophagitis      6. Elevated prostate specific antigen (PSA)      7. Hyperlipidemia, unspecified hyperlipidemia type    - Lipid panel reflex to direct LDL Fasting    8. Routine general medical examination at a health care facility    - UA reflex to Microscopic and Culture  - CBC with platelets  - Comprehensive metabolic panel  - Lipid panel reflex to direct LDL Fasting  - Vitamin D Deficiency  - TSH with free T4 reflex    9. Screening for prostate cancer      10. Fatigue, unspecified type    - TSH with free T4 reflex    11. Vitamin D deficiency    - Vitamin D Deficiency    12. Vitamin B12 deficiency (non anemic)    - Vitamin B12    End of Life Planning:  Patient currently has an advanced directive:     COUNSELING:  Reviewed preventive health counseling, as reflected in patient instructions       Regular exercise       Healthy diet/nutrition       Prostate cancer screening    BP Readings from Last 1 Encounters:   12/10/18 120/70     Estimated  "body mass index is 22.89 kg/m  as calculated from the following:    Height as of 12/10/18: 1.753 m (5' 9\").    Weight as of 12/10/18: 70.3 kg (155 lb).         reports that he has quit smoking. he has never used smokeless tobacco.      Appropriate preventive services were discussed with this patient, including applicable screening as appropriate for cardiovascular disease, diabetes, osteopenia/osteoporosis, and glaucoma.  As appropriate for age/gender, discussed screening for colorectal cancer, prostate cancer, breast cancer, and cervical cancer. Checklist reviewing preventive services available has been given to the patient.    Reviewed patients plan of care and provided an AVS. The  for Spenser meets the Care Plan requirement. This Care Plan has been established and reviewed with the Patient.    Counseling Resources:  ATP IV Guidelines  Pooled Cohorts Equation Calculator  Breast Cancer Risk Calculator  FRAX Risk Assessment  ICSI Preventive Guidelines  Dietary Guidelines for Americans, 2010  USDA's MyPlate  ASA Prophylaxis  Lung CA Screening    Jose Arrieta MD  [facility]  "

## 2019-02-07 LAB
ALBUMIN SERPL-MCNC: 4.3 G/DL (ref 3.4–5)
ALP SERPL-CCNC: 76 U/L (ref 40–150)
ALT SERPL W P-5'-P-CCNC: 25 U/L (ref 0–70)
ANION GAP SERPL CALCULATED.3IONS-SCNC: 8 MMOL/L (ref 3–14)
AST SERPL W P-5'-P-CCNC: 24 U/L (ref 0–45)
BILIRUB SERPL-MCNC: 0.6 MG/DL (ref 0.2–1.3)
BUN SERPL-MCNC: 16 MG/DL (ref 7–30)
CALCIUM SERPL-MCNC: 9.5 MG/DL (ref 8.5–10.1)
CHLORIDE SERPL-SCNC: 106 MMOL/L (ref 94–109)
CHOLEST SERPL-MCNC: 167 MG/DL
CO2 SERPL-SCNC: 26 MMOL/L (ref 20–32)
CREAT SERPL-MCNC: 0.84 MG/DL (ref 0.66–1.25)
GFR SERPL CREATININE-BSD FRML MDRD: 83 ML/MIN/{1.73_M2}
GLUCOSE SERPL-MCNC: 104 MG/DL (ref 70–99)
HDLC SERPL-MCNC: 61 MG/DL
LDLC SERPL CALC-MCNC: 95 MG/DL
NONHDLC SERPL-MCNC: 106 MG/DL
POTASSIUM SERPL-SCNC: 4.5 MMOL/L (ref 3.4–5.3)
PROT SERPL-MCNC: 7.5 G/DL (ref 6.8–8.8)
SODIUM SERPL-SCNC: 140 MMOL/L (ref 133–144)
TRIGL SERPL-MCNC: 54 MG/DL
TSH SERPL DL<=0.005 MIU/L-ACNC: 2.05 MU/L (ref 0.4–4)
URATE SERPL-MCNC: 4 MG/DL (ref 3.5–7.2)

## 2019-02-08 LAB — DEPRECATED CALCIDIOL+CALCIFEROL SERPL-MC: 38 UG/L (ref 20–75)

## 2019-03-12 ENCOUNTER — TRANSFERRED RECORDS (OUTPATIENT)
Dept: HEALTH INFORMATION MANAGEMENT | Facility: CLINIC | Age: 79
End: 2019-03-12

## 2019-04-02 DIAGNOSIS — I10 BENIGN ESSENTIAL HYPERTENSION: ICD-10-CM

## 2019-04-02 NOTE — TELEPHONE ENCOUNTER
"Last Written Prescription Date:  1/15/19  Last Fill Quantity: 90 tablet,  # refills: 0   Last office visit: 2/6/2019 with prescribing provider:  Meenakshi   Future Office Visit:      Requested Prescriptions   Pending Prescriptions Disp Refills     amLODIPine (NORVASC) 5 MG tablet [Pharmacy Med Name: amLODIPine Besylate Oral Tablet 5 MG] 90 tablet 0     Sig: TAKE ONE TABLET BY MOUTH ONE TIME DAILY    Calcium Channel Blockers Protocol  Passed - 4/2/2019 11:15 AM       Passed - Blood pressure under 140/90 in past 12 months    BP Readings from Last 3 Encounters:   02/06/19 135/85   12/10/18 120/70   10/08/18 140/81                Passed - Recent (12 mo) or future (30 days) visit within the authorizing provider's specialty    Patient had office visit in the last 12 months or has a visit in the next 30 days with authorizing provider or within the authorizing provider's specialty.  See \"Patient Info\" tab in inbasket, or \"Choose Columns\" in Meds & Orders section of the refill encounter.             Passed - Medication is active on med list       Passed - Patient is age 18 or older       Passed - Normal serum creatinine on file in past 12 months    Recent Labs   Lab Test 02/06/19  0838   CR 0.84               "

## 2019-04-04 RX ORDER — AMLODIPINE BESYLATE 5 MG/1
TABLET ORAL
Qty: 90 TABLET | Refills: 0 | Status: SHIPPED | OUTPATIENT
Start: 2019-04-04 | End: 2019-07-05

## 2019-04-04 NOTE — TELEPHONE ENCOUNTER
Prescription approved per [facility] Refill Protocol.  Maria Guadalupe NAVA RN

## 2019-07-05 DIAGNOSIS — I10 BENIGN ESSENTIAL HYPERTENSION: ICD-10-CM

## 2019-07-05 NOTE — TELEPHONE ENCOUNTER
"amLODIPine (NORVASC) 5 MG tablet 90 tablet 0 4/4/2019     Last Written Prescription Date:  4/4/19  Last Fill Quantity: 90,  # refills: 0   Last office visit: 2/6/2019 with prescribing provider:  Meenakshi   Future Office Visit:   Next 5 appointments (look out 90 days)    Aug 06, 2019  9:00 AM CDT  [facility] Visit with Jose Ar[facility]eta MD  [facility] [address] [address] [phone]         Requested Prescriptions   Pending Prescriptions Disp Refills     amLODIPine (NORVASC) 5 MG tablet [Pharmacy Med Name: amLODIPine Besylate Oral Tablet 5 MG] 90 tablet 0     Sig: TAKE ONE TABLET BY MOUTH ONE TIME DAILY       Calcium Channel Blockers Protocol  Passed - 7/5/2019 11:23 AM        Passed - Blood pressure under 140/90 in past 12 months     BP Readings from Last 3 Encounters:   02/06/19 135/85   12/10/18 120/70   10/08/18 140/81                 Passed - Recent (12 mo) or future (30 days) visit within the authorizing provider's specialty     Patient had office visit in the last 12 months or has a visit in the next 30 days with authorizing provider or within the authorizing provider's specialty.  See \"Patient Info\" tab in inbasket, or \"Choose Columns\" in Meds & Orders section of the refill encounter.              Passed - Medication is active on med list        Passed - Patient is age 18 or older        Passed - Normal serum creatinine on file in past 12 months     Recent Labs   Lab Test 02/06/19  0838   CR 0.84             No flowsheet data found.    "

## 2019-07-08 RX ORDER — AMLODIPINE BESYLATE 5 MG/1
TABLET ORAL
Qty: 90 TABLET | Refills: 1 | Status: SHIPPED | OUTPATIENT
Start: 2019-07-08 | End: 2020-01-15

## 2019-07-08 NOTE — TELEPHONE ENCOUNTER
Prescription approved per [facility] Refill Protocol.  Maria Guadalupe NAVA RN

## 2019-07-15 ENCOUNTER — OFFICE VISIT (OUTPATIENT)
Dept: UROLOGY | Facility: CLINIC | Age: 79
End: 2019-07-15
Payer: COMMERCIAL

## 2019-07-15 VITALS
SYSTOLIC BLOOD PRESSURE: 140 MMHG | BODY MASS INDEX: 23.7 KG/M2 | OXYGEN SATURATION: 98 % | DIASTOLIC BLOOD PRESSURE: 70 MMHG | HEART RATE: 74 BPM | WEIGHT: 160 LBS | HEIGHT: 69 IN

## 2019-07-15 DIAGNOSIS — R97.20 ELEVATED PROSTATE SPECIFIC ANTIGEN (PSA): Primary | ICD-10-CM

## 2019-07-15 DIAGNOSIS — M10.279 DRUG-INDUCED GOUT OF FOOT, UNSPECIFIED CHRONICITY, UNSPECIFIED LATERALITY: Chronic | ICD-10-CM

## 2019-07-15 LAB
PSA SERPL-MCNC: 11.2 NG/ML (ref 0–4)
RESIDUAL VOLUME (RV) (EXTERNAL): 52 ML

## 2019-07-15 PROCEDURE — 84153 ASSAY OF PSA TOTAL: CPT | Performed by: UROLOGY

## 2019-07-15 PROCEDURE — 36415 COLL VENOUS BLD VENIPUNCTURE: CPT | Performed by: UROLOGY

## 2019-07-15 PROCEDURE — 99213 OFFICE O/P EST LOW 20 MIN: CPT | Mod: 25 | Performed by: UROLOGY

## 2019-07-15 PROCEDURE — 51798 US URINE CAPACITY MEASURE: CPT | Performed by: UROLOGY

## 2019-07-15 ASSESSMENT — MIFFLIN-ST. JEOR: SCORE: 1431.14

## 2019-07-15 ASSESSMENT — PAIN SCALES - GENERAL: PAINLEVEL: NO PAIN (0)

## 2019-07-15 NOTE — TELEPHONE ENCOUNTER
"allopurinol (ZYLOPRIM) 300 MG tablet      Last Written Prescription Date:  07/10/2018  Last Fill Quantity: 90,  # refills: 3   Last office visit: 2/6/2019 with prescribing provider:  Meenakshi   Future Office Visit:   Next 5 appointments (look out 90 days)    Aug 06, 2019  9:00 AM CDT  Office Visit with Jose Arrieta MD  [facility] ([facility]) [address] [facility] [address] [phone]           Requested Prescriptions   Pending Prescriptions Disp Refills     allopurinol (ZYLOPRIM) 300 MG tablet [Pharmacy Med Name: Allopurinol Oral Tablet 300 MG] 90 tablet 2     Sig: TAKE ONE TABLET BY MOUTH ONE TIME DAILY       Gout Agents Protocol Passed - 7/15/2019  9:45 AM        Passed - CBC on file in past 12 months     Recent Labs   Lab Test 02/06/19  0838   WBC 6.4   RBC 4.88   HGB 16.1   HCT 48.0                    Passed - ALT on file in past 12 months     Recent Labs   Lab Test 02/06/19  0838   ALT 25             Passed - Has Uric Acid on file in past 12 months and value is less than 6     Recent Labs   Lab Test 02/06/19  0838   URIC 4.0     If level is 6mg/dL or greater, ok to refill one time and refer to provider.           Passed - Recent (12 mo) or future (30 days) visit within the authorizing provider's specialty     Patient had office visit in the last 12 months or has a visit in the next 30 days with authorizing provider or within the authorizing provider's specialty.  See \"Patient Info\" tab in inbasket, or \"Choose Columns\" in Meds & Orders section of the refill encounter.              Passed - Medication is active on med list        Passed - Patient is age 18 or older        Passed - Normal serum creatinine on file in the past 12 months     Recent Labs   Lab Test 02/06/19  0838   CR 0.84               "

## 2019-07-15 NOTE — PROGRESS NOTES
"  CHIEF COMPLAINT   It was my pleasure to see Spenser Biggs who is a 79 year old male for follow-up of elevated PSA.      HPI   Spenser Biggs is a very pleasant 79 year old male who presents with a history of urinary retention. Patient has a history of a fall on the ice and was treated in hospital in [place] in January 2018 secondary to pelvic fracture with subsequent long rehab. At that time, was found to be in urinary retention. Failed TOV both in hospital and at rehab. But ultimately passed TOV in our clinic. Has since been voiding well without complaint. No hematuria or dysuria. Taking tamsulosin.     Previous prostate biopsy 4 years ago, were negative - PSA around 7 at that time. Has since had relatively high PSA, but has been stable over the past year.      PSA   7/15/2019 - 11.20  12/10/2018 - 11.50  11/21/2017 - 9.5    PHYSICAL EXAM  Patient is a 79 year old  male   Vitals: Blood pressure 140/70, pulse 74, height 1.753 m (5' 9\"), weight 72.6 kg (160 lb), SpO2 98 %.  General Appearance Adult: Body mass index is 23.63 kg/m .  Alert, no acute distress, oriented  HENT: throat/mouth:normal, good dentition  Lungs: no respiratory distress, or pursed lip breathing  Heart: No obvious jugular venous distension present  Abdomen: soft, nontender, no organomegaly or masses  Back: no CVAT  Musculoskeltal: extremities normal, no peripheral edema  Skin: no suspicious lesions or rashes  Neuro: Alert, oriented, speech and mentation normal  Psych: affect and mood normal  Gait: Normal  : CHRIS anodular, symmetric; large prostate    ASSESSMENT and PLAN  79 year old male with history of urinary retention. Voiding well currently and doing well on tamsulosin. We also discussed his rather long history of elevated PSA and previous negative biopsies. PSA remains high at about 11, but is stable. As such, he would prefer to continue observation, rather than another prostate biopsy.     - continue tamsulosin  - Follow-up 6 months with " PSA    I spent over 15 minutes with the patient.  Over half this time was spent on counseling regarding elevated PSA and BPH with weak urinary stream.    Bhavin Etienne MD  Urology  [facility]  Clinic Phone [phone]

## 2019-07-15 NOTE — NURSING NOTE
"No chief complaint on file.      Blood pressure 140/70, pulse 74, height 1.753 m (5' 9\"), weight 72.6 kg (160 lb), SpO2 98 %. Body mass index is 23.63 kg/m .    Patient Active Problem List   Diagnosis     Benign essential hypertension; goal < 140/90     Hyperlipidemia, unspecified hyperlipidemia type     Drug-induced gout of foot, unspecified chronicity, unspecified laterality     Elevated prostate specific antigen (PSA)     Chronic deep vein thrombosis (DVT) of proximal vein of lower extremity, unspecified laterality (H)     Gastroesophageal reflux disease without esophagitis     Closed nondisplaced fracture of pelvis, unspecified part of pelvis, sequela     Vitamin D deficiency       Allergies   Allergen Reactions     Seasonal Allergies      Hay Fever       Current Outpatient Medications   Medication Sig Dispense Refill     allopurinol (ZYLOPRIM) 300 MG tablet TAKE ONE TABLET BY MOUTH ONE TIME DAILY  90 tablet 3     amLODIPine (NORVASC) 5 MG tablet TAKE ONE TABLET BY MOUTH ONE TIME DAILY  90 tablet 1     COENZYME Q-10 PO Take 200 mg by mouth daily       DiphenhydrAMINE HCl (BENADRYL PO) Take by mouth nightly as needed       metoprolol tartrate (LOPRESSOR) 50 MG tablet TK 1 T PO  BID. 180 tablet 3     Multiple Vitamins-Minerals (EYE VITAMINS PO) Take 1 tablet by mouth daily       simvastatin (ZOCOR) 20 MG tablet TAKE 1 TABLET (20 MG) BY MOUTH AT BEDTIME 90 tablet 2       Social History     Tobacco Use     Smoking status: Former Smoker     Smokeless tobacco: Never Used     Tobacco comment: 2 cigars per year occasionally   Substance Use Topics     Alcohol use: Yes     Alcohol/week: 0.0 oz     Comment: a beer at night     Drug use: No       JONO Peralta  7/15/2019  1:58 PM       "

## 2019-07-17 RX ORDER — ALLOPURINOL 300 MG/1
TABLET ORAL
Qty: 90 TABLET | Refills: 1 | Status: SHIPPED | OUTPATIENT
Start: 2019-07-17 | End: 2020-01-15

## 2019-08-06 ENCOUNTER — OFFICE VISIT (OUTPATIENT)
Dept: FAMILY MEDICINE | Facility: CLINIC | Age: 79
End: 2019-08-06
Payer: COMMERCIAL

## 2019-08-06 VITALS
HEART RATE: 82 BPM | BODY MASS INDEX: 24.59 KG/M2 | TEMPERATURE: 97.7 F | OXYGEN SATURATION: 96 % | WEIGHT: 166 LBS | HEIGHT: 69 IN | SYSTOLIC BLOOD PRESSURE: 139 MMHG | DIASTOLIC BLOOD PRESSURE: 88 MMHG

## 2019-08-06 DIAGNOSIS — M10.279 DRUG-INDUCED GOUT OF FOOT, UNSPECIFIED CHRONICITY, UNSPECIFIED LATERALITY: ICD-10-CM

## 2019-08-06 DIAGNOSIS — I10 BENIGN ESSENTIAL HYPERTENSION: ICD-10-CM

## 2019-08-06 DIAGNOSIS — K21.9 GASTROESOPHAGEAL REFLUX DISEASE WITHOUT ESOPHAGITIS: ICD-10-CM

## 2019-08-06 DIAGNOSIS — I82.5Z9 CHRONIC DEEP VEIN THROMBOSIS (DVT) OF DISTAL VEIN OF LOWER EXTREMITY, UNSPECIFIED LATERALITY (H): Primary | ICD-10-CM

## 2019-08-06 DIAGNOSIS — K21.00 GASTROESOPHAGEAL REFLUX DISEASE WITH ESOPHAGITIS: ICD-10-CM

## 2019-08-06 DIAGNOSIS — R97.20 ELEVATED PROSTATE SPECIFIC ANTIGEN (PSA): ICD-10-CM

## 2019-08-06 PROCEDURE — 99214 OFFICE O/P EST MOD 30 MIN: CPT | Performed by: INTERNAL MEDICINE

## 2019-08-06 ASSESSMENT — MIFFLIN-ST. JEOR: SCORE: 1458.35

## 2019-08-06 NOTE — PROGRESS NOTES
Subjective     Spenser Biggs is a 79 year old male who presents to clinic today for the following health issues:    HPI     Pt states that he jogging/walking every day. Each session is about an hour. Pt takes BP reading at a [employer] and post jog his BP is around 148/84, . After walking around [employer] for 30 minutes his BP is around 128/80, HR 90.     Pt states that he was experiencing left shoulder pain, but after starting a weight lifting program the pain subsided.     No episodes of gout.    Denies swelling in his legs.     PSA 11.2        Patient Active Problem List   Diagnosis     Benign essential hypertension; goal < 140/90     Hyperlipidemia, unspecified hyperlipidemia type     Drug-induced gout of foot, unspecified chronicity, unspecified laterality     Elevated prostate specific antigen (PSA)     Chronic deep vein thrombosis (DVT) of proximal vein of lower extremity, unspecified laterality (H)     Gastroesophageal reflux disease without esophagitis     Closed nondisplaced fracture of pelvis, unspecified part of pelvis, sequela     Vitamin D deficiency     Past Surgical History:   Procedure Laterality Date     APPENDECTOMY      at age 7     ENT SURGERY      tonsilectomy at age 5     ESOPHAGOSCOPY, GASTROSCOPY, DUODENOSCOPY (EGD), COMBINED N/A 4/30/2018    Procedure: COMBINED ESOPHAGOSCOPY, GASTROSCOPY, DUODENOSCOPY (EGD), BIOPSY SINGLE OR MULTIPLE;  gastroscopy;  Surgeon: Art Clay MD;  Location:  GI     VASECTOMY         Social History     Tobacco Use     Smoking status: Former Smoker     Smokeless tobacco: Never Used     Tobacco comment: 2 cigars per year occasionally   Substance Use Topics     Alcohol use: Yes     Alcohol/week: 0.0 oz     Comment: a beer at night     Family History   Problem Relation Age of Onset     Cerebrovascular Disease Mother      Diabetes Father      Coronary Artery Disease Father      Obesity Father      Uterine Cancer Maternal Grandmother      Osteoporosis Sister  "     Genetic Disorder Daughter          Current Outpatient Medications   Medication Sig Dispense Refill     allopurinol (ZYLOPRIM) 300 MG tablet TAKE ONE TABLET BY MOUTH ONE TIME DAILY  90 tablet 1     amLODIPine (NORVASC) 5 MG tablet TAKE ONE TABLET BY MOUTH ONE TIME DAILY  90 tablet 1     COENZYME Q-10 PO Take 200 mg by mouth daily       DiphenhydrAMINE HCl (BENADRYL PO) Take by mouth nightly as needed       Multiple Vitamins-Minerals (EYE VITAMINS PO) Take 1 tablet by mouth daily       simvastatin (ZOCOR) 20 MG tablet TAKE 1 TABLET (20 MG) BY MOUTH AT BEDTIME 90 tablet 2     metoprolol tartrate (LOPRESSOR) 50 MG tablet TK 1 T PO  BID. (Patient not taking: Reported on 8/6/2019) 180 tablet 3     Allergies   Allergen Reactions     Seasonal Allergies      Hay Fever       Reviewed and updated as needed this visit by Provider      Review of Systems   ROS COMP: Constitutional, HEENT, cardiovascular, pulmonary, gi and gu systems are negative, except as otherwise noted.    This document serves as a record of the services and decisions personally performed and made by Jose Arrieta MD. It was created on his behalf by Pascual Matthews, a trained medical scribe. The creation of this document is based on the provider's statements to the medical scribe.  Pascual Matthews August 6, 2019 9:02 AM        Objective    /88 (BP Location: Left arm, Patient Position: Chair, Cuff Size: Adult Large)   Pulse 82   Temp 97.7  F (36.5  C) (Oral)   Ht 1.753 m (5' 9\")   Wt 75.3 kg (166 lb)   SpO2 96%   BMI 24.51 kg/m    Body mass index is 24.51 kg/m .     BP Recheck   134/80    Physical Exam   Neck was supple without adenopathy or thyromegaly his carotids were normal without bruits  Chest clear to auscultation and percussion  Cardiovascular S1 and S2 are physiologic with occasional skip, withoutmurmurs or gallops  Abdomen bowel sounds were normal.  There is no palpable mass or organomegaly  Extremities nontender without any edema  Pulses " pedal pulses are as described otherwise his pulses are bilaterally symmetrical throughout without bruits  Skin without significant abnormality      Diagnostic Test Results:  Labs reviewed in Epic  none       Assessment & Plan     Chronic deep vein thrombosis (DVT) of distal vein of lower extremity, unspecified laterality (H)  Well-controlled with current therapy    Drug-induced gout of foot, unspecified chronicity, unspecified laterality  Well managed with current therapies.     Gastroesophageal reflux disease with esophagitis  No recent symptoms to suggest ongoing reflux or esophagitis    Benign essential hypertension; goal < 140/90  Rechecked, BP improved. Advised pt to continue to monitor routinely.       Elevated prostate specific antigen (PSA)  Reviewed PSA (11.2). Will continue to monitor.        FUTURE APPOINTMENTS:       - Follow-up visit in 7 months for wellness visit     The information in this document, created by the medical scribe for me, accurately reflects the services I personally performed and the decisions made by me. I have reviewed and approved this document for accuracy prior to leaving the patient care area.  August 6, 2019 9:37 AM    Jose Arrieta MD  [facility]

## 2019-10-03 ENCOUNTER — HEALTH MAINTENANCE LETTER (OUTPATIENT)
Age: 79
End: 2019-10-03

## 2019-10-16 DIAGNOSIS — E78.5 HYPERLIPIDEMIA, UNSPECIFIED HYPERLIPIDEMIA TYPE: ICD-10-CM

## 2019-10-16 NOTE — TELEPHONE ENCOUNTER
"Last Written Prescription Date:  1/3/19  Last Fill Quantity: 90,  # refills: 2   Last office visit: 8/6/2019 with prescribing provider:     Future Office Visit:    Requested Prescriptions   Pending Prescriptions Disp Refills     simvastatin (ZOCOR) 20 MG tablet [Pharmacy Med Name: Simvastatin Oral Tablet 20 MG] 90 tablet 1     Sig: TAKE ONE TABLET BY MOUTH NIGHTLY AT BEDTIME       Statins Protocol Passed - 10/16/2019 12:28 PM        Passed - LDL on file in past 12 months     Recent Labs   Lab Test 02/06/19  0838   LDL 95             Passed - No abnormal creatine kinase in past 12 months     No lab results found.             Passed - Recent (12 mo) or future (30 days) visit within the authorizing provider's specialty     Patient has had an office visit with the authorizing provider or a provider within the authorizing providers department within the previous 12 mos or has a future within next 30 days. See \"Patient Info\" tab in inbasket, or \"Choose Columns\" in Meds & Orders section of the refill encounter.              Passed - Medication is active on med list        Passed - Patient is age 18 or older          "

## 2019-10-17 RX ORDER — SIMVASTATIN 20 MG
TABLET ORAL
Qty: 90 TABLET | Refills: 0 | Status: SHIPPED | OUTPATIENT
Start: 2019-10-17 | End: 2020-01-15

## 2020-01-09 DIAGNOSIS — R97.20 ELEVATED PROSTATE SPECIFIC ANTIGEN (PSA): Primary | ICD-10-CM

## 2020-01-14 DIAGNOSIS — I10 BENIGN ESSENTIAL HYPERTENSION: ICD-10-CM

## 2020-01-14 DIAGNOSIS — M10.279 DRUG-INDUCED GOUT OF FOOT, UNSPECIFIED CHRONICITY, UNSPECIFIED LATERALITY: Chronic | ICD-10-CM

## 2020-01-14 DIAGNOSIS — E78.5 HYPERLIPIDEMIA, UNSPECIFIED HYPERLIPIDEMIA TYPE: ICD-10-CM

## 2020-01-14 NOTE — TELEPHONE ENCOUNTER
"amLODIPine (NORVASC) 5 MG tablet     Last Written Prescription Date:  7/8/2019  Last Fill Quantity: 90,  # refills: 1   Last office visit: 8/6/2019 with prescribing provider:  Dr. Arrieta    Future Office Visit:  Unknown     allopurinol (ZYLOPRIM) 300 MG tablet     Last Written Prescription Date:  7/17/2019  Last Fill Quantity: 90,  # refills: 1   Last office visit: 8/6/2019 with prescribing provider:  Dr. Arrieta    Future Office Visit:  Unknown     simvastatin (ZOCOR) 20 MG tablet    Last Written Prescription Date:  10/17/2019  Last Fill Quantity: 90,  # refills: 0   Last office visit: 8/6/2019 with prescribing provider:  Dr. Arrieta   Future Office Visit:  unknown    Requested Prescriptions   Pending Prescriptions Disp Refills     amLODIPine (NORVASC) 5 MG tablet [Pharmacy Med Name: amLODIPine Besylate Oral Tablet 5 MG] 90 tablet 0     Sig: TAKE ONE TABLET BY MOUTH ONE TIME DAILY       Calcium Channel Blockers Protocol  Passed - 1/14/2020 11:42 AM        Passed - Blood pressure under 140/90 in past 12 months     BP Readings from Last 3 Encounters:   08/06/19 139/88   07/15/19 140/70   02/06/19 135/85                 Passed - Recent (12 mo) or future (30 days) visit within the authorizing provider's specialty     Patient has had an office visit with the authorizing provider or a provider within the authorizing providers department within the previous 12 mos or has a future within next 30 days. See \"Patient Info\" tab in inbasket, or \"Choose Columns\" in Meds & Orders section of the refill encounter.              Passed - Medication is active on med list        Passed - Patient is age 18 or older        Passed - Normal serum creatinine on file in past 12 months     Recent Labs   Lab Test 02/06/19  0838   CR 0.84             allopurinol (ZYLOPRIM) 300 MG tablet [Pharmacy Med Name: Allopurinol Oral Tablet 300 MG] 90 tablet 0     Sig: TAKE ONE TABLET BY MOUTH ONE TIME DAILY       Gout Agents Protocol Passed - 1/14/2020 " "11:42 AM        Passed - CBC on file in past 12 months     Recent Labs   Lab Test 02/06/19  0838   WBC 6.4   RBC 4.88   HGB 16.1   HCT 48.0                    Passed - ALT on file in past 12 months     Recent Labs   Lab Test 02/06/19  0838   ALT 25             Passed - Has Uric Acid on file in past 12 months and value is less than 6     Recent Labs   Lab Test 02/06/19  0838   URIC 4.0     If level is 6mg/dL or greater, ok to refill one time and refer to provider.           Passed - Recent (12 mo) or future (30 days) visit within the authorizing provider's specialty     Patient has had an office visit with the authorizing provider or a provider within the authorizing providers department within the previous 12 mos or has a future within next 30 days. See \"Patient Info\" tab in inbasket, or \"Choose Columns\" in Meds & Orders section of the refill encounter.              Passed - Medication is active on med list        Passed - Patient is age 18 or older        Passed - Normal serum creatinine on file in the past 12 months     Recent Labs   Lab Test 02/06/19  0838   CR 0.84             simvastatin (ZOCOR) 20 MG tablet [Pharmacy Med Name: Simvastatin Oral Tablet 20 MG] 90 tablet 0     Sig: TAKE 1 TAB BY MOUTH DAILY AT BEDTIME.       Statins Protocol Passed - 1/14/2020 11:42 AM        Passed - LDL on file in past 12 months     Recent Labs   Lab Test 02/06/19  0838   LDL 95             Passed - No abnormal creatine kinase in past 12 months     No lab results found.             Passed - Recent (12 mo) or future (30 days) visit within the authorizing provider's specialty     Patient has had an office visit with the authorizing provider or a provider within the authorizing providers department within the previous 12 mos or has a future within next 30 days. See \"Patient Info\" tab in inbasket, or \"Choose Columns\" in Meds & Orders section of the refill encounter.              Passed - Medication is active on med list       "  Passed - Patient is age 18 or older

## 2020-01-15 RX ORDER — AMLODIPINE BESYLATE 5 MG/1
TABLET ORAL
Qty: 90 TABLET | Refills: 2 | Status: SHIPPED | OUTPATIENT
Start: 2020-01-15 | End: 2020-10-08

## 2020-01-15 RX ORDER — SIMVASTATIN 20 MG
TABLET ORAL
Qty: 90 TABLET | Refills: 2 | Status: SHIPPED | OUTPATIENT
Start: 2020-01-15 | End: 2020-10-08

## 2020-01-15 RX ORDER — ALLOPURINOL 300 MG/1
TABLET ORAL
Qty: 90 TABLET | Refills: 2 | Status: SHIPPED | OUTPATIENT
Start: 2020-01-15

## 2020-01-17 ENCOUNTER — OFFICE VISIT (OUTPATIENT)
Dept: UROLOGY | Facility: CLINIC | Age: 80
End: 2020-01-17
Payer: COMMERCIAL

## 2020-01-17 VITALS
BODY MASS INDEX: 24.59 KG/M2 | WEIGHT: 166 LBS | HEIGHT: 69 IN | OXYGEN SATURATION: 98 % | HEART RATE: 55 BPM | DIASTOLIC BLOOD PRESSURE: 76 MMHG | SYSTOLIC BLOOD PRESSURE: 128 MMHG

## 2020-01-17 DIAGNOSIS — R97.20 ELEVATED PROSTATE SPECIFIC ANTIGEN (PSA): Primary | ICD-10-CM

## 2020-01-17 LAB — PSA SERPL-MCNC: 17.3 NG/ML (ref 0–4)

## 2020-01-17 PROCEDURE — 99213 OFFICE O/P EST LOW 20 MIN: CPT | Performed by: UROLOGY

## 2020-01-17 PROCEDURE — 36415 COLL VENOUS BLD VENIPUNCTURE: CPT | Performed by: UROLOGY

## 2020-01-17 PROCEDURE — 84153 ASSAY OF PSA TOTAL: CPT | Performed by: UROLOGY

## 2020-01-17 ASSESSMENT — MIFFLIN-ST. JEOR: SCORE: 1458.35

## 2020-01-17 ASSESSMENT — PAIN SCALES - GENERAL: PAINLEVEL: NO PAIN (0)

## 2020-01-17 NOTE — PROGRESS NOTES
"  CHIEF COMPLAINT   It was my pleasure to see Spenser Biggs who is a 79 year old male for follow-up of elevated PSA and urinary retention.      HPI   Spenser Biggs is a very pleasant 79 year old male who presents with a history of urinary retention. Patient has a history of a fall on the ice and was treated in hospital in [place] in January 2018 secondary to pelvic fracture with subsequent long rehab. At that time, was found to be in urinary retention but has since passed TOV in our clinic. Has since been voiding well without complaint. No hematuria or dysuria. Taking tamsulosin.     Previous prostate biopsy several years ago, was negative - PSA around 7 at that time. Has since had relatively high PSA with has jumped today to 17.3. No new symptoms.     PSA   1/17/2020 - 17.30  7/15/2019 - 11.20  12/10/2018 - 11.50  11/21/2017 - 9.5    PHYSICAL EXAM  Patient is a 79 year old  male   Vitals: Blood pressure 128/76, pulse 55, height 1.753 m (5' 9\"), weight 75.3 kg (166 lb), SpO2 98 %.  General Appearance Adult: Body mass index is 24.51 kg/m .  Alert, no acute distress, oriented  HENT: throat/mouth:normal, good dentition  Lungs: no respiratory distress, or pursed lip breathing  Heart: No obvious jugular venous distension present  Abdomen: soft, nontender, no organomegaly or masses  Back: no CVAT  Musculoskeltal: extremities normal, no peripheral edema  Skin: no suspicious lesions or rashes  Neuro: Alert, oriented, speech and mentation normal  Psych: affect and mood normal  Gait: Normal    ASSESSMENT and PLAN  79 year old male with history of urinary retention. Voiding well currently and doing well on tamsulosin. We also discussed his rather long history of elevated PSA and previous negative biopsies. PSA has jumped again today to 17.3. We discussed this finding in context of his previous biopsies. He has not previously had an MRI. Will plan for this and I will call him with results and discussion about possible prostate " biopsy.     - continue tamsulosin  - MRI prostate - will call with results    I spent over 15 minutes with the patient.  Over half this time was spent on counseling regarding elevated PSA.    Bhavin Etienne MD  Urology  [facility]

## 2020-01-17 NOTE — NURSING NOTE
Chief Complaint   Patient presents with     Clinic Care Coordination - Follow-up     Pt here for same day PSA     Celina Elizabeth, FORREST

## 2020-01-26 ENCOUNTER — ANCILLARY PROCEDURE (OUTPATIENT)
Dept: MRI IMAGING | Facility: CLINIC | Age: 80
End: 2020-01-26
Attending: UROLOGY
Payer: COMMERCIAL

## 2020-01-26 DIAGNOSIS — R97.20 ELEVATED PROSTATE SPECIFIC ANTIGEN (PSA): ICD-10-CM

## 2020-01-26 LAB — RADIOLOGIST FLAGS: NORMAL

## 2020-01-26 RX ORDER — GADOBUTROL 604.72 MG/ML
7.5 INJECTION INTRAVENOUS ONCE
Status: COMPLETED | OUTPATIENT
Start: 2020-01-26 | End: 2020-01-26

## 2020-01-26 RX ADMIN — GADOBUTROL 7.5 ML: 604.72 INJECTION INTRAVENOUS at 13:58

## 2020-02-07 ASSESSMENT — ACTIVITIES OF DAILY LIVING (ADL): CURRENT_FUNCTION: NO ASSISTANCE NEEDED

## 2020-02-12 ENCOUNTER — HOSPITAL ENCOUNTER (OUTPATIENT)
Facility: CLINIC | Age: 80
Setting detail: SPECIMEN
Discharge: HOME | End: 2020-02-12
Admitting: INTERNAL MEDICINE
Payer: COMMERCIAL

## 2020-02-12 ENCOUNTER — OFFICE VISIT (OUTPATIENT)
Dept: FAMILY MEDICINE | Facility: CLINIC | Age: 80
End: 2020-02-12
Payer: COMMERCIAL

## 2020-02-12 VITALS
TEMPERATURE: 97 F | BODY MASS INDEX: 24.07 KG/M2 | HEART RATE: 73 BPM | OXYGEN SATURATION: 97 % | DIASTOLIC BLOOD PRESSURE: 75 MMHG | WEIGHT: 163 LBS | SYSTOLIC BLOOD PRESSURE: 133 MMHG

## 2020-02-12 DIAGNOSIS — L30.9 DERMATITIS: ICD-10-CM

## 2020-02-12 DIAGNOSIS — Z13.29 SCREENING FOR HYPOTHYROIDISM: ICD-10-CM

## 2020-02-12 DIAGNOSIS — R97.20 ELEVATED PROSTATE SPECIFIC ANTIGEN (PSA): ICD-10-CM

## 2020-02-12 DIAGNOSIS — I10 BENIGN ESSENTIAL HYPERTENSION: ICD-10-CM

## 2020-02-12 DIAGNOSIS — E55.9 VITAMIN D DEFICIENCY: ICD-10-CM

## 2020-02-12 DIAGNOSIS — K21.9 GASTROESOPHAGEAL REFLUX DISEASE WITHOUT ESOPHAGITIS: ICD-10-CM

## 2020-02-12 DIAGNOSIS — S32.9XXA CLOSED NONDISPLACED FRACTURE OF PELVIS, UNSPECIFIED PART OF PELVIS, INITIAL ENCOUNTER (H): Primary | ICD-10-CM

## 2020-02-12 DIAGNOSIS — Z00.00 ROUTINE GENERAL MEDICAL EXAMINATION AT A HEALTH CARE FACILITY: ICD-10-CM

## 2020-02-12 DIAGNOSIS — M10.279 DRUG-INDUCED GOUT OF FOOT, UNSPECIFIED CHRONICITY, UNSPECIFIED LATERALITY: ICD-10-CM

## 2020-02-12 DIAGNOSIS — I82.5Y9 CHRONIC DEEP VEIN THROMBOSIS (DVT) OF PROXIMAL VEIN OF LOWER EXTREMITY, UNSPECIFIED LATERALITY (H): ICD-10-CM

## 2020-02-12 DIAGNOSIS — E78.5 HYPERLIPIDEMIA, UNSPECIFIED HYPERLIPIDEMIA TYPE: ICD-10-CM

## 2020-02-12 LAB
ALBUMIN SERPL-MCNC: 3.9 G/DL (ref 3.4–5)
ALBUMIN UR-MCNC: NEGATIVE MG/DL
ALP SERPL-CCNC: 82 U/L (ref 40–150)
ALT SERPL W P-5'-P-CCNC: 25 U/L (ref 0–70)
ANION GAP SERPL CALCULATED.3IONS-SCNC: 7 MMOL/L (ref 3–14)
APPEARANCE UR: CLEAR
AST SERPL W P-5'-P-CCNC: 21 U/L (ref 0–45)
BACTERIA #/AREA URNS HPF: ABNORMAL /HPF
BILIRUB SERPL-MCNC: 0.6 MG/DL (ref 0.2–1.3)
BILIRUB UR QL STRIP: NEGATIVE
BUN SERPL-MCNC: 18 MG/DL (ref 7–30)
CALCIUM SERPL-MCNC: 9 MG/DL (ref 8.5–10.1)
CHLORIDE SERPL-SCNC: 108 MMOL/L (ref 94–109)
CHOLEST SERPL-MCNC: 154 MG/DL
CO2 SERPL-SCNC: 25 MMOL/L (ref 20–32)
COLOR UR AUTO: YELLOW
CREAT SERPL-MCNC: 0.77 MG/DL (ref 0.66–1.25)
ERYTHROCYTE [DISTWIDTH] IN BLOOD BY AUTOMATED COUNT: 13.6 % (ref 10–15)
GFR SERPL CREATININE-BSD FRML MDRD: 86 ML/MIN/{1.73_M2}
GLUCOSE SERPL-MCNC: 99 MG/DL (ref 70–99)
GLUCOSE UR STRIP-MCNC: NEGATIVE MG/DL
HCT VFR BLD AUTO: 46.3 % (ref 40–53)
HDLC SERPL-MCNC: 58 MG/DL
HGB BLD-MCNC: 15.6 G/DL (ref 13.3–17.7)
HGB UR QL STRIP: ABNORMAL
KETONES UR STRIP-MCNC: NEGATIVE MG/DL
LDLC SERPL CALC-MCNC: 85 MG/DL
LEUKOCYTE ESTERASE UR QL STRIP: NEGATIVE
MCH RBC QN AUTO: 33.4 PG (ref 26.5–33)
MCHC RBC AUTO-ENTMCNC: 33.7 G/DL (ref 31.5–36.5)
MCV RBC AUTO: 99 FL (ref 78–100)
MUCOUS THREADS #/AREA URNS LPF: PRESENT /LPF
NITRATE UR QL: NEGATIVE
NONHDLC SERPL-MCNC: 96 MG/DL
PH UR STRIP: 6 PH (ref 5–7)
PLATELET # BLD AUTO: 180 10E9/L (ref 150–450)
POTASSIUM SERPL-SCNC: 4 MMOL/L (ref 3.4–5.3)
PROT SERPL-MCNC: 7.4 G/DL (ref 6.8–8.8)
RBC # BLD AUTO: 4.67 10E12/L (ref 4.4–5.9)
RBC #/AREA URNS AUTO: ABNORMAL /HPF
SODIUM SERPL-SCNC: 140 MMOL/L (ref 133–144)
SOURCE: ABNORMAL
SP GR UR STRIP: 1.02 (ref 1–1.03)
TRIGL SERPL-MCNC: 54 MG/DL
TSH SERPL DL<=0.005 MIU/L-ACNC: 1.34 MU/L (ref 0.4–4)
UROBILINOGEN UR STRIP-ACNC: 0.2 EU/DL (ref 0.2–1)
WBC # BLD AUTO: 8.3 10E9/L (ref 4–11)
WBC #/AREA URNS AUTO: ABNORMAL /HPF

## 2020-02-12 PROCEDURE — 99397 PER PM REEVAL EST PAT 65+ YR: CPT | Performed by: INTERNAL MEDICINE

## 2020-02-12 PROCEDURE — 36415 COLL VENOUS BLD VENIPUNCTURE: CPT | Performed by: INTERNAL MEDICINE

## 2020-02-12 PROCEDURE — 80053 COMPREHEN METABOLIC PANEL: CPT | Performed by: INTERNAL MEDICINE

## 2020-02-12 PROCEDURE — 84443 ASSAY THYROID STIM HORMONE: CPT | Performed by: INTERNAL MEDICINE

## 2020-02-12 PROCEDURE — 82306 VITAMIN D 25 HYDROXY: CPT | Performed by: INTERNAL MEDICINE

## 2020-02-12 PROCEDURE — 80061 LIPID PANEL: CPT | Performed by: INTERNAL MEDICINE

## 2020-02-12 PROCEDURE — 81001 URINALYSIS AUTO W/SCOPE: CPT | Performed by: INTERNAL MEDICINE

## 2020-02-12 PROCEDURE — 85027 COMPLETE CBC AUTOMATED: CPT | Performed by: INTERNAL MEDICINE

## 2020-02-12 RX ORDER — TRIAMCINOLONE ACETONIDE 1 MG/G
CREAM TOPICAL 2 TIMES DAILY
Qty: 30 G | Refills: 1 | Status: SHIPPED | OUTPATIENT
Start: 2020-02-12

## 2020-02-12 ASSESSMENT — ACTIVITIES OF DAILY LIVING (ADL): CURRENT_FUNCTION: NO ASSISTANCE NEEDED

## 2020-02-12 NOTE — PROGRESS NOTES
"SUBJECTIVE:   Spenser Biggs is a 79 year old male who presents for Preventive Visit.      Are you in the first 12 months of your Medicare coverage?  No    Healthy Habits:     In general, how would you rate your overall health?  Good    Frequency of exercise:  4-5 days/week    Duration of exercise:  30-45 minutes    Do you usually eat at least 4 servings of fruit and vegetables a day, include whole grains    & fiber and avoid regularly eating high fat or \"junk\" foods?  Yes    Taking medications regularly:  Yes    Medication side effects:  No muscle aches    Ability to successfully perform activities of daily living:  No assistance needed    Home Safety:  No safety concerns identified    Hearing Impairment:  No hearing concerns    In the past 6 months, have you been bothered by leaking of urine?  No    In general, how would you rate your overall mental or emotional health?  Good      PHQ-2 Total Score: 0    Additional concerns today:  No  Pt with a hx of pelvis fracture, elevated PSA, DVT, gout, GERD, HLD and HTN presents to the clinic for a routine physical exam. The pt recalls that while in [place] he slipped on some ice and fractured his pelvis. He notes that after his XR in the [facility] he returned to [place], but became unconscious so his sister called 911. The pt was admitted to the hospital and was Dx with PNA and sepsis. The pt followed his hospital admission with PT to improved his pelvis. He states these incidents occurred ~2 years ago. Today the pt denies having pelvic pain and claims that he jogs regularly.     For exercise the pt jogs regularly. He recently ran a 5k and plans to enter more running events.     Patient denies any headaches, back or neck pain, dyspnea, chest pain, palpitations, heartburn, acid indigestion, hematochezia, urinary issues, nocturia, musculoskeletal issues, or skin changes.    Do you feel safe in your environment? Yes    Have you ever done Advance Care Planning? (For example, a " Health Directive, POLST, or a discussion with a medical provider or your loved ones about your wishes): No, advance care planning information given to patient to review.  Advanced care planning was discussed at today's visit.      Fall risk  Fallen 2 or more times in the past year?: No  Any fall with injury in the past year?: No    Cognitive Screening   1) Repeat 3 items (Leader, Season, Table)    2) Clock draw: NORMAL  3) 3 item recall: Recalls 3 objects  Results: 3 items recalled: COGNITIVE IMPAIRMENT LESS LIKELY    Mini-CogTM Copyright S [employer]. Licensed by the author for use in [facility]; reprinted with permission (gloria@[facility]). All rights reserved.      Do you have sleep apnea, excessive snoring or daytime drowsiness?: no    Reviewed and updated as needed this visit by clinical staff  Tobacco  Allergies  Meds         Reviewed and updated as needed this visit by Provider        Social History     Tobacco Use     Smoking status: Former Smoker     Smokeless tobacco: Never Used     Tobacco comment: 2 cigars per year occasionally   Substance Use Topics     Alcohol use: Yes     Alcohol/week: 0.0 standard drinks     Comment: a beer at night     If you drink alcohol do you typically have >3 drinks per day or >7 drinks per week? No    Alcohol Use 2/12/2020   Prescreen: >3 drinks/day or >7 drinks/week? -   Prescreen: >3 drinks/day or >7 drinks/week? No               Current providers sharing in care for this patient include:   Patient Care Team:  Jose Arrieta MD as PCP - General (Internal Medicine)  Jose Arrieta MD as Assigned PCP    The following health maintenance items are reviewed in Epic and correct as of today:  Health Maintenance   Topic Date Due     ADVANCE CARE PLANNING  1940     ZOSTER IMMUNIZATION (1 of 2) 03/11/1990     MEDICARE ANNUAL WELLNESS VISIT  02/06/2020     FALL RISK ASSESSMENT  08/06/2020     LIPID  02/06/2024     DTAP/TDAP/TD IMMUNIZATION (2 - Td) 04/03/2027      PHQ-2  Completed     INFLUENZA VACCINE  Completed     PNEUMOCOCCAL IMMUNIZATION 65+ LOW/MEDIUM RISK  Completed     IPV IMMUNIZATION  Aged Out     MENINGITIS IMMUNIZATION  Aged Out     Patient Active Problem List   Diagnosis     Benign essential hypertension; goal < 140/90     Hyperlipidemia, unspecified hyperlipidemia type     Drug-induced gout of foot, unspecified chronicity, unspecified laterality     Elevated prostate specific antigen (PSA)     Chronic deep vein thrombosis (DVT) of proximal vein of lower extremity, unspecified laterality (H)     Gastroesophageal reflux disease without esophagitis     Closed nondisplaced fracture of pelvis, unspecified part of pelvis, sequela     Vitamin D deficiency     Past Surgical History:   Procedure Laterality Date     ABDOMEN SURGERY  Appendectomy age 5     APPENDECTOMY      at age 7     BIOPSY  2013    Prostate biopsy     COLONOSCOPY  12/14/17    next due in 5 yrs.     ENT SURGERY      tonsilectomy at age 5     ESOPHAGOSCOPY, GASTROSCOPY, DUODENOSCOPY (EGD), COMBINED N/A 4/30/2018    Procedure: COMBINED ESOPHAGOSCOPY, GASTROSCOPY, DUODENOSCOPY (EGD), BIOPSY SINGLE OR MULTIPLE;  gastroscopy;  Surgeon: Art Clay MD;  Location: [facility]     VASECTOMY         Social History     Tobacco Use     Smoking status: Former Smoker     Smokeless tobacco: Never Used     Tobacco comment: 2 cigars per year occasionally   Substance Use Topics     Alcohol use: Yes     Alcohol/week: 0.0 standard drinks     Comment: a beer at night     Family History   Problem Relation Age of Onset     Cerebrovascular Disease Mother      Diabetes Father      Coronary Artery Disease Father      Obesity Father      Uterine Cancer Maternal Grandmother      Osteoporosis Sister      Genetic Disorder Daughter      Other Cancer Maternal Grandfather          Current Outpatient Medications   Medication Sig Dispense Refill     allopurinol (ZYLOPRIM) 300 MG tablet TAKE ONE TABLET BY MOUTH ONE TIME DAILY  90  tablet 2     amLODIPine (NORVASC) 5 MG tablet TAKE ONE TABLET BY MOUTH ONE TIME DAILY  90 tablet 2     COENZYME Q-10 PO Take 200 mg by mouth daily       DiphenhydrAMINE HCl (BENADRYL PO) Take by mouth nightly as needed       Multiple Vitamins-Minerals (EYE VITAMINS PO) Take 1 tablet by mouth daily       simvastatin (ZOCOR) 20 MG tablet TAKE 1 TAB BY MOUTH DAILY AT BEDTIME. 90 tablet 2     triamcinolone (KENALOG) 0.1 % external cream Apply topically 2 times daily 30 g 1     Allergies   Allergen Reactions     Seasonal Allergies      Hay Fever       Review of Systems  CONSTITUTIONAL: NEGATIVE for fever, chills, change in weight  INTEGUMENTARY/SKIN: NEGATIVE for worrisome rashes, moles or lesions  EYES: POSITIVE for vision changes NEGATIVE for irritation  ENT/MOUTH: NEGATIVE for ear, mouth and throat problems  RESP: NEGATIVE for significant cough or SOB  BREAST: NEGATIVE for masses, tenderness or discharge  CV: NEGATIVE for chest pain, palpitations or peripheral edema  GI: POSITIVE for loose stools on 02/09/2020 (resolved) NEGATIVE for nausea, abdominal pain or heartburn  : NEGATIVE for frequency, dysuria, or hematuria  MUSCULOSKELETAL: NEGATIVE for significant arthralgias or myalgia  NEURO: NEGATIVE for weakness, dizziness or paresthesias  ENDOCRINE: NEGATIVE for temperature intolerance, skin/hair changes  HEME: NEGATIVE for bleeding problems  PSYCHIATRIC: NEGATIVE for changes in mood or affect    This document serves as a record of the services and decisions personally performed and made by Jose Arrieta MD. It was created on his behalf by Pascual Matthews, a trained medical scribe. The creation of this document is based on the provider's statements to the medical scribe.  Pascual Matthews February 12, 2020 7:58 AM      OBJECTIVE:   /75 (BP Location: Left arm, Cuff Size: Adult Regular)   Pulse 73   Temp 97  F (36.1  C) (Oral)   Wt 73.9 kg (163 lb)   SpO2 97%   BMI 24.07 kg/m   Estimated body mass index is 24.07  "kg/m  as calculated from the following:    Height as of 1/17/20: 1.753 m (5' 9\").    Weight as of this encounter: 73.9 kg (163 lb).     Physical Exam  GENERAL: healthy, alert and no distress  EYES: Eyes grossly normal to inspection, PERRL and conjunctivae and sclerae normal  HENT: ear canals and TM's normal, nose and mouth without ulcers or lesions  NECK: no adenopathy, no asymmetry, masses, or scars and thyroid normal to palpation  RESP: lungs clear to auscultation - no rales, rhonchi or wheezes  CV: regular rate and rhythm, normal S1 S2, no S3 or S4, no murmur, click or rub, no peripheral edema and peripheral pulses strong  ABDOMEN: soft, nontender, no hepatosplenomegaly, no masses and bowel sounds normal, probable contact dermatitis on the right lower umbilical area from his belt (? Nickel dermatitis)    (male): uncircumcised, normal male genitalia without lesions or urethral discharge, no hernia  MS: no gross musculoskeletal defects noted, no edema, DTR absent throughout   SKIN: no suspicious lesions or rashes  NEURO: Normal strength and tone, mentation intact and speech normal  PSYCH: mentation appears normal, affect normal/bright    Diagnostic Test Results:  Labs reviewed in Epic  No results found for this or any previous visit (from the past 24 hour(s)).    ASSESSMENT / PLAN:   Closed nondisplaced fracture of pelvis, unspecified part of pelvis, initial encounter (H)      Elevated prostate specific antigen (PSA)      Chronic deep vein thrombosis (DVT) of proximal vein of lower extremity, unspecified laterality (H)  Continuing to monitor.     Drug-induced gout of foot, unspecified chronicity, unspecified laterality      Gastroesophageal reflux disease without esophagitis      Hyperlipidemia, unspecified hyperlipidemia type    - Lipid panel reflex to direct LDL Fasting    Benign essential hypertension; goal < 140/90  Well controlled with current therapies   - UA reflex to Microscopic and Culture  - CBC with " "platelets  - Comprehensive metabolic panel    Routine general medical examination at a health care facility  The pt will receive a report concerning his lab results once they are made available.   - UA reflex to Microscopic and Culture  - CBC with platelets  - Comprehensive metabolic panel  - Lipid panel reflex to direct LDL Fasting  - TSH with free T4 reflex  - Vitamin D Deficiency    Vitamin D deficiency    - Vitamin D Deficiency    Screening for hypothyroidism    - TSH with free T4 reflex    Dermatitis  Recommended triamcinolone cream mixed with Eucerin cream for the contact dermatitis of his abdomen.   - triamcinolone (KENALOG) 0.1 % external cream  Dispense: 30 g; Refill: 1        COUNSELING:  Reviewed preventive health counseling, as reflected in patient instructions       Regular exercise       Healthy diet/nutrition       Fall risk prevention    Estimated body mass index is 24.07 kg/m  as calculated from the following:    Height as of 1/17/20: 1.753 m (5' 9\").    Weight as of this encounter: 73.9 kg (163 lb).         reports that he has quit smoking. He has never used smokeless tobacco.      Appropriate preventive services were discussed with this patient, including applicable screening as appropriate for cardiovascular disease, diabetes, osteopenia/osteoporosis, and glaucoma.  As appropriate for age/gender, discussed screening for colorectal cancer, prostate cancer, breast cancer, and cervical cancer. Checklist reviewing preventive services available has been given to the patient.    Reviewed patients plan of care and provided an AVS. The Basic Care Plan (routine screening as documented in Health Maintenance) for Spenser meets the Care Plan requirement. This Care Plan has been established and reviewed with the Patient.    Counseling Resources:  ATP IV Guidelines  Pooled Cohorts Equation Calculator  Breast Cancer Risk Calculator  FRAX Risk Assessment  ICSI Preventive Guidelines  Dietary Guidelines for " Americans, 2010  USDA's MyPlate  ASA Prophylaxis  Lung CA Screening    The information in this document, created by the medical scribe for me, accurately reflects the services I personally performed and the decisions made by me. I have reviewed and approved this document for accuracy prior to leaving the patient care area.  February 12, 2020 8:39 AM    Jose Arrieta MD  [facility]    Identified Health Risks:

## 2020-02-13 LAB — DEPRECATED CALCIDIOL+CALCIFEROL SERPL-MC: 37 UG/L (ref 20–75)

## 2020-07-07 DIAGNOSIS — R97.20 ELEVATED PROSTATE SPECIFIC ANTIGEN (PSA): Primary | ICD-10-CM

## 2020-07-07 LAB — PSA SERPL-MCNC: 9.6 NG/ML (ref 0–4)

## 2020-07-07 PROCEDURE — 84153 ASSAY OF PSA TOTAL: CPT | Performed by: UROLOGY

## 2020-07-07 PROCEDURE — 36415 COLL VENOUS BLD VENIPUNCTURE: CPT | Performed by: UROLOGY

## 2020-07-17 ENCOUNTER — VIRTUAL VISIT (OUTPATIENT)
Dept: UROLOGY | Facility: CLINIC | Age: 80
End: 2020-07-17
Payer: COMMERCIAL

## 2020-07-17 VITALS — WEIGHT: 165 LBS | BODY MASS INDEX: 24.44 KG/M2 | HEIGHT: 69 IN

## 2020-07-17 DIAGNOSIS — R97.20 ELEVATED PROSTATE SPECIFIC ANTIGEN (PSA): Primary | Chronic | ICD-10-CM

## 2020-07-17 PROCEDURE — 99212 OFFICE O/P EST SF 10 MIN: CPT | Mod: 95 | Performed by: UROLOGY

## 2020-07-17 RX ORDER — ANTIOX #8/OM3/DHA/EPA/LUT/ZEAX 250-2.5 MG
CAPSULE ORAL
COMMUNITY
Start: 2020-02-07

## 2020-07-17 ASSESSMENT — PAIN SCALES - GENERAL: PAINLEVEL: NO PAIN (0)

## 2020-07-17 ASSESSMENT — MIFFLIN-ST. JEOR: SCORE: 1448.82

## 2020-07-17 NOTE — PROGRESS NOTES
"Spenser Biggs is a 80 year old male who is being evaluated via a billable telephone visit.      The patient has been notified of following:     \"This telephone visit will be conducted via a call between you and your physician/provider. We have found that certain health care needs can be provided without the need for a physical exam.  This service lets us provide the care you need with a short phone conversation.  If a prescription is necessary we can send it directly to your pharmacy.  If lab work is needed we can place an order for that and you can then stop by our lab to have the test done at a later time.    Telephone visits are billed at different rates depending on your insurance coverage. During this emergency period, for some insurers they may be billed the same as an in-person visit.  Please reach out to your insurance provider with any questions.    If during the course of the call the physician/provider feels a telephone visit is not appropriate, you will not be charged for this service.\"    Patient has given verbal consent for Telephone visit?  Yes     What phone number would you like to be contacted at? [phone]     How would you like to obtain your AVS? MyChart    Chief Complaint   It was my pleasure to speak with Spenser Biggs who is a 80 year old male for follow-up of Elevated PSA.    HPI  Spenser Biggs is a very pleasant 80 year old male who presents with a history of urinary retention. Patient has a history of a fall on the ice and was treated in hospital in [place] in January 2018 secondary to pelvic fracture with subsequent long rehab. At that time, was found to be in urinary retention but now voiding without issue. No longer taking tamsulosin.      Previous prostate biopsy several years ago, was negative - PSA around 7 at that time. Has since had relatively high PSA but now down to 9.60. MRI earlier this year was PIRADS 2.     PSA   7/7/2020 - 9.60  1/17/2020 - 17.30  7/15/2019 - " 11.20  12/10/2018 - 11.50  11/21/2017 - 9.5    MRI Prostate 1/26/2020  IMPRESSION:  1. Based on the most suspicious abnormality, this exam is  characterized as PIRADS 2 - Clinically significant cancer is unlikely  to be present.  2. Indeterminant pelvic lymph nodes as described above. Recommend  clinical correlation.  3. No suspicious bone lesions.  4. Additional incidental findings as described above.    I have reviewed and updated the patient's Past Medical History, Social History, Family History and Medication List.    ALLERGIES  Seasonal allergies    ASSESSMENT and PLAN  80 year old male with history of urinary retention and elevated PSA. Voiding well currently and no longer taking tamsulsoin. We also discussed his rather long history of elevated PSA and previous negative biopsies. PSA remains stable and he has a large 148g prostate on MRI. We discussed this finding in context of his previous biopsies and that it is unlikely he has a clinically significant cancer. Would be reasonable to stop PSA testing given his age and comorbidities, but will defer to future discussion with his primary care provider. I am happy to see him back in the future if there are any additional questions or concerns.     Phone call duration: 11 minutes    Bhavin Etienne MD  Urology  [facility]

## 2020-07-17 NOTE — NURSING NOTE
Chief Complaint   Patient presents with     Elevated PSA     PSA follow up.      Karol Cornejo, CMA

## 2020-07-17 NOTE — LETTER
"7/17/2020      RE: Spenser Biggs  [address] [place] [id number]       Spenser Biggs is a 80 year old male who is being evaluated via a billable telephone visit.      The patient has been notified of following:     \"This telephone visit will be conducted via a call between you and your physician/provider. We have found that certain health care needs can be provided without the need for a physical exam.  This service lets us provide the care you need with a short phone conversation.  If a prescription is necessary we can send it directly to your pharmacy.  If lab work is needed we can place an order for that and you can then stop by our lab to have the test done at a later time.    Telephone visits are billed at different rates depending on your insurance coverage. During this emergency period, for some insurers they may be billed the same as an in-person visit.  Please reach out to your insurance provider with any questions.    If during the course of the call the physician/provider feels a telephone visit is not appropriate, you will not be charged for this service.\"    Patient has given verbal consent for Telephone visit?  Yes     What phone number would you like to be contacted at? [phone]     How would you like to obtain your AVS? Genevieveharsarita    Chief Complaint   It was my pleasure to speak with Spenser Biggs who is a 80 year old male for follow-up of Elevated PSA.    HPI  Spenser Biggs is a very pleasant 80 year old male who presents with a history of urinary retention. Patient has a history of a fall on the ice and was treated in hospital in [place] in January 2018 secondary to pelvic fracture with subsequent long rehab. At that time, was found to be in urinary retention but now voiding without issue. No longer taking tamsulosin.      Previous prostate biopsy several years ago, was negative - PSA around 7 at that time. Has since had relatively high PSA but now down to 9.60. MRI earlier this " year was PIRADS 2.     PSA   7/7/2020 - 9.60  1/17/2020 - 17.30  7/15/2019 - 11.20  12/10/2018 - 11.50  11/21/2017 - 9.5    MRI Prostate 1/26/2020  IMPRESSION:  1. Based on the most suspicious abnormality, this exam is  characterized as PIRADS 2 - Clinically significant cancer is unlikely  to be present.  2. Indeterminant pelvic lymph nodes as described above. Recommend  clinical correlation.  3. No suspicious bone lesions.  4. Additional incidental findings as described above.    I have reviewed and updated the patient's Past Medical History, Social History, Family History and Medication List.    ALLERGIES  Seasonal allergies    ASSESSMENT and PLAN  80 year old male with history of urinary retention and elevated PSA. Voiding well currently and no longer taking tamsulsoin. We also discussed his rather long history of elevated PSA and previous negative biopsies. PSA remains stable and he has a large 148g prostate on MRI. We discussed this finding in context of his previous biopsies and that it is unlikely he has a clinically significant cancer. Would be reasonable to stop PSA testing given his age and comorbidities, but will defer to future discussion with his primary care provider. I am happy to see him back in the future if there are any additional questions or concerns.     Phone call duration: 11 minutes    Bhavin Etienne MD  Urology  [facility]

## 2020-10-08 DIAGNOSIS — E78.5 HYPERLIPIDEMIA, UNSPECIFIED HYPERLIPIDEMIA TYPE: ICD-10-CM

## 2020-10-08 DIAGNOSIS — I10 BENIGN ESSENTIAL HYPERTENSION: ICD-10-CM

## 2020-10-09 RX ORDER — AMLODIPINE BESYLATE 5 MG/1
5 TABLET ORAL DAILY
Qty: 90 TABLET | Refills: 1 | Status: SHIPPED | OUTPATIENT
Start: 2020-10-09 | End: 2020-10-09

## 2020-10-09 RX ORDER — SIMVASTATIN 20 MG
TABLET ORAL
Qty: 90 TABLET | Refills: 1 | Status: SHIPPED | OUTPATIENT
Start: 2020-10-09 | End: 2020-10-09

## 2020-10-21 ENCOUNTER — TRANSFERRED RECORDS (OUTPATIENT)
Dept: HEALTH INFORMATION MANAGEMENT | Facility: CLINIC | Age: 80
End: 2020-10-21

## 2020-10-27 NOTE — TELEPHONE ENCOUNTER
Placed screen print on Dr. Arrieta's desk to address patient's question below.     Laina Morales RN     Pt A&Ox4, Bps have been soft, HR 100s-120s. Sats around 96% on 2L NC. Denies any pain, but has some abdominal discomfort. LS diminished, NPO ex meds and ice chips. NS @ 150.  SBA, positive for Cdiff. Getting IV and PO abx. Bladder scan was 59. He said he doesn't feel the need to straight cath now.  Will pass on to days.  Colorectal to follow.  Continue plan of care

## 2020-11-07 ENCOUNTER — HEALTH MAINTENANCE LETTER (OUTPATIENT)
Age: 80
End: 2020-11-07

## 2021-03-21 ENCOUNTER — HEALTH MAINTENANCE LETTER (OUTPATIENT)
Age: 81
End: 2021-03-21

## 2021-08-08 NOTE — TELEPHONE ENCOUNTER
"Requested Prescriptions   Pending Prescriptions Disp Refills     simvastatin (ZOCOR) 20 MG tablet [Pharmacy Med Name: SIMVASTATIN 20 MG TABLET]  This may be a duplicate med  Last Written Prescription Date:  10/31/17  Last Fill Quantity: 90 tablet,  # refills: 3   Last Office Visit with [facility], [facility] or [facility] prescribing provider:  11/21/17 [facility]   Future Office Visit:    90 tablet 2     Sig: TAKE 1 TABLET (20 MG) BY MOUTH AT BEDTIME    Statins Protocol Passed    1/11/2018  5:08 PM       Passed - LDL on file in past 12 months    Recent Labs   Lab Test  03/30/17   1003   LDL  94          Passed - No abnormal creatine kinase in past 12 months    No lab results found.         Passed - Recent or future visit with authorizing provider    Patient had office visit in the last year or has a visit in the next 30 days with authorizing provider.  See \"Patient Info\" tab in inbasket, or \"Choose Columns\" in Meds & Orders section of the refill encounter.          Passed - Patient is age 18 or older          "
RX refused.  Duplicate request.   Pharmacy notified.     Tarsha MORRIS RN,BSN      
None

## 2021-09-05 ENCOUNTER — HEALTH MAINTENANCE LETTER (OUTPATIENT)
Age: 81
End: 2021-09-05

## 2022-04-17 ENCOUNTER — HEALTH MAINTENANCE LETTER (OUTPATIENT)
Age: 82
End: 2022-04-17

## 2022-10-23 ENCOUNTER — HEALTH MAINTENANCE LETTER (OUTPATIENT)
Age: 82
End: 2022-10-23

## 2023-06-01 ENCOUNTER — HEALTH MAINTENANCE LETTER (OUTPATIENT)
Age: 83
End: 2023-06-01

## (undated) RX ORDER — FENTANYL CITRATE 50 UG/ML
INJECTION, SOLUTION INTRAMUSCULAR; INTRAVENOUS
Status: DISPENSED
Start: 2018-04-30

## (undated) RX ORDER — FENTANYL CITRATE 50 UG/ML
INJECTION, SOLUTION INTRAMUSCULAR; INTRAVENOUS
Status: DISPENSED
Start: 2017-12-14